# Patient Record
Sex: MALE | Employment: FULL TIME | URBAN - NONMETROPOLITAN AREA
[De-identification: names, ages, dates, MRNs, and addresses within clinical notes are randomized per-mention and may not be internally consistent; named-entity substitution may affect disease eponyms.]

---

## 2021-06-11 ENCOUNTER — APPOINTMENT (OUTPATIENT)
Dept: ULTRASOUND IMAGING | Age: 66
DRG: 683 | End: 2021-06-11
Attending: INTERNAL MEDICINE
Payer: COMMERCIAL

## 2021-06-11 ENCOUNTER — HOSPITAL ENCOUNTER (INPATIENT)
Age: 66
LOS: 3 days | Discharge: HOME | DRG: 683 | End: 2021-06-14
Attending: INTERNAL MEDICINE
Payer: COMMERCIAL

## 2021-06-11 DIAGNOSIS — N17.9 ACUTE KIDNEY INJURY (HCC): Primary | ICD-10-CM

## 2021-06-11 LAB
ANION GAP SERPL CALCULATED.3IONS-SCNC: 12 MEQ/L (ref 8–16)
BASOPHILS # BLD: 0.4 %
BASOPHILS ABSOLUTE: 0 THOU/MM3 (ref 0–0.1)
EOSINOPHIL # BLD: 0.8 %
EOSINOPHILS ABSOLUTE: 0.1 THOU/MM3 (ref 0–0.4)
ERYTHROCYTE [DISTWIDTH] IN BLOOD BY AUTOMATED COUNT: 13 % (ref 11.5–14.5)
ERYTHROCYTE [DISTWIDTH] IN BLOOD BY AUTOMATED COUNT: 42.8 FL (ref 35–45)
GFR SERPL CREATININE-BSD FRML MDRD: 12 ML/MIN/1.73M2
HCT VFR BLD CALC: 41.2 % (ref 42–52)
HEMOGLOBIN: 13.1 GM/DL (ref 14–18)
IMMATURE GRANS (ABS): 0.03 THOU/MM3 (ref 0–0.07)
IMMATURE GRANULOCYTES: 0.4 %
LYMPHOCYTES # BLD: 12.1 %
LYMPHOCYTES ABSOLUTE: 0.9 THOU/MM3 (ref 1–4.8)
MCH RBC QN AUTO: 28.6 PG (ref 26–33)
MCHC RBC AUTO-ENTMCNC: 31.8 GM/DL (ref 32.2–35.5)
MCV RBC AUTO: 90 FL (ref 80–94)
MONOCYTES # BLD: 6 %
MONOCYTES ABSOLUTE: 0.5 THOU/MM3 (ref 0.4–1.3)
NUCLEATED RED BLOOD CELLS: 0 /100 WBC
PLATELET # BLD: 173 THOU/MM3 (ref 130–400)
PMV BLD AUTO: 9.6 FL (ref 9.4–12.4)
RBC # BLD: 4.58 MILL/MM3 (ref 4.7–6.1)
SEG NEUTROPHILS: 80.3 %
SEGMENTED NEUTROPHILS ABSOLUTE COUNT: 6.1 THOU/MM3 (ref 1.8–7.7)
WBC # BLD: 7.6 THOU/MM3 (ref 4.8–10.8)

## 2021-06-11 PROCEDURE — 36415 COLL VENOUS BLD VENIPUNCTURE: CPT

## 2021-06-11 PROCEDURE — 99223 1ST HOSP IP/OBS HIGH 75: CPT | Performed by: INTERNAL MEDICINE

## 2021-06-11 PROCEDURE — 82550 ASSAY OF CK (CPK): CPT

## 2021-06-11 PROCEDURE — 6360000002 HC RX W HCPCS: Performed by: INTERNAL MEDICINE

## 2021-06-11 PROCEDURE — 76770 US EXAM ABDO BACK WALL COMP: CPT

## 2021-06-11 PROCEDURE — 83036 HEMOGLOBIN GLYCOSYLATED A1C: CPT

## 2021-06-11 PROCEDURE — 1200000003 HC TELEMETRY R&B

## 2021-06-11 PROCEDURE — 93005 ELECTROCARDIOGRAM TRACING: CPT | Performed by: INTERNAL MEDICINE

## 2021-06-11 PROCEDURE — 80053 COMPREHEN METABOLIC PANEL: CPT

## 2021-06-11 PROCEDURE — 6370000000 HC RX 637 (ALT 250 FOR IP): Performed by: INTERNAL MEDICINE

## 2021-06-11 PROCEDURE — 85025 COMPLETE CBC W/AUTO DIFF WBC: CPT

## 2021-06-11 PROCEDURE — 2580000003 HC RX 258: Performed by: INTERNAL MEDICINE

## 2021-06-11 RX ORDER — SIMVASTATIN 40 MG
40 TABLET ORAL NIGHTLY
COMMUNITY

## 2021-06-11 RX ORDER — SERTRALINE HYDROCHLORIDE 100 MG/1
150 TABLET, FILM COATED ORAL DAILY
COMMUNITY

## 2021-06-11 RX ORDER — CYCLOBENZAPRINE HCL 10 MG
10 TABLET ORAL 2 TIMES DAILY
COMMUNITY
End: 2022-11-01

## 2021-06-11 RX ORDER — KRILL/OM-3/DHA/EPA/PHOSPHO/AST 500MG-86MG
500 CAPSULE ORAL 2 TIMES DAILY
COMMUNITY

## 2021-06-11 RX ORDER — DEXTROSE MONOHYDRATE 25 G/50ML
12.5 INJECTION, SOLUTION INTRAVENOUS PRN
Status: DISCONTINUED | OUTPATIENT
Start: 2021-06-11 | End: 2021-06-12 | Stop reason: SDUPTHER

## 2021-06-11 RX ORDER — SODIUM CHLORIDE 9 MG/ML
25 INJECTION, SOLUTION INTRAVENOUS CONTINUOUS
Status: DISCONTINUED | OUTPATIENT
Start: 2021-06-11 | End: 2021-06-12

## 2021-06-11 RX ORDER — HEPARIN SODIUM 5000 [USP'U]/ML
5000 INJECTION, SOLUTION INTRAVENOUS; SUBCUTANEOUS EVERY 8 HOURS SCHEDULED
Status: DISCONTINUED | OUTPATIENT
Start: 2021-06-11 | End: 2021-06-14 | Stop reason: HOSPADM

## 2021-06-11 RX ORDER — LISINOPRIL 20 MG/1
20 TABLET ORAL DAILY
Status: ON HOLD | COMMUNITY
End: 2021-06-14 | Stop reason: HOSPADM

## 2021-06-11 RX ORDER — BUPROPION HYDROCHLORIDE 150 MG/1
150 TABLET, EXTENDED RELEASE ORAL DAILY
COMMUNITY

## 2021-06-11 RX ORDER — ACETAMINOPHEN 650 MG/1
650 SUPPOSITORY RECTAL EVERY 6 HOURS PRN
Status: DISCONTINUED | OUTPATIENT
Start: 2021-06-11 | End: 2021-06-14 | Stop reason: HOSPADM

## 2021-06-11 RX ORDER — GLIPIZIDE 10 MG/1
10 TABLET ORAL DAILY
COMMUNITY

## 2021-06-11 RX ORDER — ONDANSETRON 2 MG/ML
4 INJECTION INTRAMUSCULAR; INTRAVENOUS EVERY 6 HOURS PRN
Status: DISCONTINUED | OUTPATIENT
Start: 2021-06-11 | End: 2021-06-14 | Stop reason: HOSPADM

## 2021-06-11 RX ORDER — NICOTINE POLACRILEX 4 MG
15 LOZENGE BUCCAL PRN
Status: DISCONTINUED | OUTPATIENT
Start: 2021-06-11 | End: 2021-06-11 | Stop reason: SDUPTHER

## 2021-06-11 RX ORDER — DEXTROSE MONOHYDRATE 50 MG/ML
100 INJECTION, SOLUTION INTRAVENOUS PRN
Status: DISCONTINUED | OUTPATIENT
Start: 2021-06-11 | End: 2021-06-14 | Stop reason: HOSPADM

## 2021-06-11 RX ORDER — DEXTROSE MONOHYDRATE 25 G/50ML
12.5 INJECTION, SOLUTION INTRAVENOUS PRN
Status: DISCONTINUED | OUTPATIENT
Start: 2021-06-11 | End: 2021-06-11 | Stop reason: SDUPTHER

## 2021-06-11 RX ORDER — POLYETHYLENE GLYCOL 3350 17 G/17G
17 POWDER, FOR SOLUTION ORAL DAILY PRN
Status: DISCONTINUED | OUTPATIENT
Start: 2021-06-11 | End: 2021-06-14 | Stop reason: HOSPADM

## 2021-06-11 RX ORDER — DEXTROSE MONOHYDRATE 50 MG/ML
100 INJECTION, SOLUTION INTRAVENOUS PRN
Status: DISCONTINUED | OUTPATIENT
Start: 2021-06-11 | End: 2021-06-11 | Stop reason: SDUPTHER

## 2021-06-11 RX ORDER — ACETAMINOPHEN 325 MG/1
650 TABLET ORAL EVERY 6 HOURS PRN
Status: DISCONTINUED | OUTPATIENT
Start: 2021-06-11 | End: 2021-06-14 | Stop reason: HOSPADM

## 2021-06-11 RX ORDER — SODIUM CHLORIDE 0.9 % (FLUSH) 0.9 %
10 SYRINGE (ML) INJECTION PRN
Status: DISCONTINUED | OUTPATIENT
Start: 2021-06-11 | End: 2021-06-14 | Stop reason: HOSPADM

## 2021-06-11 RX ORDER — SODIUM CHLORIDE 0.9 % (FLUSH) 0.9 %
10 SYRINGE (ML) INJECTION EVERY 12 HOURS SCHEDULED
Status: DISCONTINUED | OUTPATIENT
Start: 2021-06-11 | End: 2021-06-14 | Stop reason: HOSPADM

## 2021-06-11 RX ORDER — M-VIT,TX,IRON,MINS/CALC/FOLIC 27MG-0.4MG
1 TABLET ORAL DAILY
COMMUNITY

## 2021-06-11 RX ORDER — MAGNESIUM SULFATE IN WATER 40 MG/ML
2000 INJECTION, SOLUTION INTRAVENOUS PRN
Status: DISCONTINUED | OUTPATIENT
Start: 2021-06-11 | End: 2021-06-11

## 2021-06-11 RX ORDER — SODIUM POLYSTYRENE SULFONATE 15 G/60ML
30 SUSPENSION ORAL; RECTAL ONCE
Status: COMPLETED | OUTPATIENT
Start: 2021-06-11 | End: 2021-06-11

## 2021-06-11 RX ORDER — SODIUM CHLORIDE 9 MG/ML
25 INJECTION, SOLUTION INTRAVENOUS CONTINUOUS
Status: DISCONTINUED | OUTPATIENT
Start: 2021-06-11 | End: 2021-06-11

## 2021-06-11 RX ORDER — NICOTINE POLACRILEX 4 MG
15 LOZENGE BUCCAL PRN
Status: DISCONTINUED | OUTPATIENT
Start: 2021-06-11 | End: 2021-06-12 | Stop reason: SDUPTHER

## 2021-06-11 RX ORDER — ONDANSETRON 4 MG/1
4 TABLET, ORALLY DISINTEGRATING ORAL EVERY 8 HOURS PRN
Status: DISCONTINUED | OUTPATIENT
Start: 2021-06-11 | End: 2021-06-14 | Stop reason: HOSPADM

## 2021-06-11 RX ADMIN — CALCIUM GLUCONATE 2000 MG: 98 INJECTION, SOLUTION INTRAVENOUS at 23:52

## 2021-06-11 RX ADMIN — SODIUM POLYSTYRENE SULFONATE 30 G: 15 SUSPENSION ORAL; RECTAL at 23:52

## 2021-06-11 RX ADMIN — INSULIN HUMAN 10 UNITS: 100 INJECTION, SOLUTION PARENTERAL at 23:52

## 2021-06-11 RX ADMIN — SODIUM CHLORIDE 25 ML: 9 INJECTION, SOLUTION INTRAVENOUS at 21:45

## 2021-06-11 ASSESSMENT — PAIN SCALES - GENERAL: PAINLEVEL_OUTOF10: 0

## 2021-06-11 NOTE — PROGRESS NOTES
Transfer  Report from  Vince King   Referring Physician  Dr. Edwige Dalal  Accepting Physician  Dr. Dereck Brown  Patient Condition:     59. Patient at [facility] ER of Dr. Edwige Dalal. Patient has a history of HTN, DM. Patient had been feeling bad had outpatient labs drawn and was told to go to ER due to lab results. K= 6.3, CR 5.4, , GFR 11, CK 72- no renal hx . Still making urine. UA and CBC \" good\" . Vitals B/P 120/56, HR 72, T 98.2, 97% RA.  Admit Inpatient, 6k-tele, Acute kidney injury

## 2021-06-12 LAB
ALBUMIN SERPL-MCNC: 4.1 G/DL (ref 3.5–5.1)
ALBUMIN SERPL-MCNC: 4.7 G/DL (ref 3.5–5.1)
ALP BLD-CCNC: 50 U/L (ref 38–126)
ALP BLD-CCNC: 55 U/L (ref 38–126)
ALT SERPL-CCNC: 28 U/L (ref 11–66)
ALT SERPL-CCNC: 31 U/L (ref 11–66)
ANION GAP SERPL CALCULATED.3IONS-SCNC: 14 MEQ/L (ref 8–16)
AST SERPL-CCNC: 16 U/L (ref 5–40)
AST SERPL-CCNC: 16 U/L (ref 5–40)
AVERAGE GLUCOSE: 141 MG/DL (ref 70–126)
AVERAGE GLUCOSE: 141 MG/DL (ref 70–126)
BACTERIA: NORMAL
BILIRUB SERPL-MCNC: 0.2 MG/DL (ref 0.3–1.2)
BILIRUB SERPL-MCNC: 0.3 MG/DL (ref 0.3–1.2)
BILIRUBIN URINE: NEGATIVE
BLOOD, URINE: NEGATIVE
BUN BLDV-MCNC: 98 MG/DL (ref 7–22)
BUN BLDV-MCNC: 99 MG/DL (ref 7–22)
CALCIUM SERPL-MCNC: 9.4 MG/DL (ref 8.5–10.5)
CALCIUM SERPL-MCNC: 9.9 MG/DL (ref 8.5–10.5)
CASTS: NORMAL /LPF
CASTS: NORMAL /LPF
CHARACTER, URINE: CLEAR
CHLORIDE BLD-SCNC: 103 MEQ/L (ref 98–111)
CHLORIDE BLD-SCNC: 104 MEQ/L (ref 98–111)
CO2: 19 MEQ/L (ref 23–33)
CO2: 20 MEQ/L (ref 23–33)
COLOR: YELLOW
CREAT SERPL-MCNC: 4.7 MG/DL (ref 0.4–1.2)
CREAT SERPL-MCNC: 4.8 MG/DL (ref 0.4–1.2)
CRYSTALS: NORMAL
EKG ATRIAL RATE: 72 BPM
EKG ATRIAL RATE: 75 BPM
EKG P AXIS: 32 DEGREES
EKG P AXIS: 38 DEGREES
EKG P-R INTERVAL: 176 MS
EKG P-R INTERVAL: 190 MS
EKG Q-T INTERVAL: 328 MS
EKG Q-T INTERVAL: 346 MS
EKG QRS DURATION: 92 MS
EKG QRS DURATION: 92 MS
EKG QTC CALCULATION (BAZETT): 366 MS
EKG QTC CALCULATION (BAZETT): 378 MS
EKG R AXIS: -48 DEGREES
EKG R AXIS: -53 DEGREES
EKG T AXIS: 47 DEGREES
EKG T AXIS: 51 DEGREES
EKG VENTRICULAR RATE: 72 BPM
EKG VENTRICULAR RATE: 75 BPM
EPITHELIAL CELLS, UA: NORMAL /HPF
ERYTHROCYTE [DISTWIDTH] IN BLOOD BY AUTOMATED COUNT: 13.1 % (ref 11.5–14.5)
ERYTHROCYTE [DISTWIDTH] IN BLOOD BY AUTOMATED COUNT: 42.8 FL (ref 35–45)
GFR SERPL CREATININE-BSD FRML MDRD: 13 ML/MIN/1.73M2
GLUCOSE BLD-MCNC: 116 MG/DL (ref 70–108)
GLUCOSE BLD-MCNC: 135 MG/DL (ref 70–108)
GLUCOSE BLD-MCNC: 175 MG/DL (ref 70–108)
GLUCOSE BLD-MCNC: 201 MG/DL (ref 70–108)
GLUCOSE BLD-MCNC: 88 MG/DL (ref 70–108)
GLUCOSE, URINE: NEGATIVE MG/DL
HBA1C MFR BLD: 6.7 % (ref 4.4–6.4)
HBA1C MFR BLD: 6.7 % (ref 4.4–6.4)
HCT VFR BLD CALC: 38.1 % (ref 42–52)
HEMOGLOBIN: 12.3 GM/DL (ref 14–18)
KETONES, URINE: NEGATIVE
LACTIC ACID: 1.2 MMOL/L (ref 0.5–2)
LEUKOCYTE ESTERASE, URINE: NEGATIVE
MCH RBC QN AUTO: 29.1 PG (ref 26–33)
MCHC RBC AUTO-ENTMCNC: 32.3 GM/DL (ref 32.2–35.5)
MCV RBC AUTO: 90.3 FL (ref 80–94)
MISCELLANEOUS LAB TEST RESULT: NORMAL
NITRITE, URINE: NEGATIVE
PH UA: 6.5 (ref 5–9)
PLATELET # BLD: 146 THOU/MM3 (ref 130–400)
PMV BLD AUTO: 10.1 FL (ref 9.4–12.4)
POTASSIUM REFLEX MAGNESIUM: 6.9 MEQ/L (ref 3.5–5.2)
POTASSIUM REFLEX MAGNESIUM: 7.1 MEQ/L (ref 3.5–5.2)
POTASSIUM SERPL-SCNC: 4.7 MEQ/L (ref 3.5–5.2)
POTASSIUM SERPL-SCNC: 6.2 MEQ/L (ref 3.5–5.2)
PROTEIN UA: NEGATIVE MG/DL
RBC # BLD: 4.22 MILL/MM3 (ref 4.7–6.1)
RBC URINE: NORMAL /HPF
RENAL EPITHELIAL, UA: NORMAL
SODIUM BLD-SCNC: 136 MEQ/L (ref 135–145)
SODIUM BLD-SCNC: 136 MEQ/L (ref 135–145)
SPECIFIC GRAVITY UA: 1.01 (ref 1–1.03)
TOTAL CK: 62 U/L (ref 55–170)
TOTAL PROTEIN: 6.9 G/DL (ref 6.1–8)
TOTAL PROTEIN: 7.1 G/DL (ref 6.1–8)
UROBILINOGEN, URINE: 0.2 EU/DL (ref 0–1)
WBC # BLD: 5.3 THOU/MM3 (ref 4.8–10.8)
WBC UA: NORMAL /HPF
YEAST: NORMAL

## 2021-06-12 PROCEDURE — 1200000003 HC TELEMETRY R&B

## 2021-06-12 PROCEDURE — 85027 COMPLETE CBC AUTOMATED: CPT

## 2021-06-12 PROCEDURE — 6360000002 HC RX W HCPCS: Performed by: INTERNAL MEDICINE

## 2021-06-12 PROCEDURE — 2580000003 HC RX 258: Performed by: INTERNAL MEDICINE

## 2021-06-12 PROCEDURE — 93010 ELECTROCARDIOGRAM REPORT: CPT | Performed by: NUCLEAR MEDICINE

## 2021-06-12 PROCEDURE — 2500000003 HC RX 250 WO HCPCS: Performed by: INTERNAL MEDICINE

## 2021-06-12 PROCEDURE — 80053 COMPREHEN METABOLIC PANEL: CPT

## 2021-06-12 PROCEDURE — 6370000000 HC RX 637 (ALT 250 FOR IP): Performed by: INTERNAL MEDICINE

## 2021-06-12 PROCEDURE — 36415 COLL VENOUS BLD VENIPUNCTURE: CPT

## 2021-06-12 PROCEDURE — 81001 URINALYSIS AUTO W/SCOPE: CPT

## 2021-06-12 PROCEDURE — 51798 US URINE CAPACITY MEASURE: CPT

## 2021-06-12 PROCEDURE — 84132 ASSAY OF SERUM POTASSIUM: CPT

## 2021-06-12 PROCEDURE — 99233 SBSQ HOSP IP/OBS HIGH 50: CPT | Performed by: INTERNAL MEDICINE

## 2021-06-12 PROCEDURE — 82948 REAGENT STRIP/BLOOD GLUCOSE: CPT

## 2021-06-12 PROCEDURE — 6370000000 HC RX 637 (ALT 250 FOR IP): Performed by: STUDENT IN AN ORGANIZED HEALTH CARE EDUCATION/TRAINING PROGRAM

## 2021-06-12 PROCEDURE — 93005 ELECTROCARDIOGRAM TRACING: CPT | Performed by: INTERNAL MEDICINE

## 2021-06-12 PROCEDURE — 99254 IP/OBS CNSLTJ NEW/EST MOD 60: CPT | Performed by: INTERNAL MEDICINE

## 2021-06-12 PROCEDURE — 83605 ASSAY OF LACTIC ACID: CPT

## 2021-06-12 PROCEDURE — 2500000003 HC RX 250 WO HCPCS: Performed by: FAMILY MEDICINE

## 2021-06-12 RX ORDER — SODIUM POLYSTYRENE SULFONATE 15 G/60ML
30 SUSPENSION ORAL; RECTAL ONCE
Status: COMPLETED | OUTPATIENT
Start: 2021-06-12 | End: 2021-06-12

## 2021-06-12 RX ORDER — CYCLOBENZAPRINE HCL 10 MG
5 TABLET ORAL 2 TIMES DAILY
Status: DISCONTINUED | OUTPATIENT
Start: 2021-06-12 | End: 2021-06-14 | Stop reason: HOSPADM

## 2021-06-12 RX ORDER — CYCLOBENZAPRINE HCL 10 MG
10 TABLET ORAL 2 TIMES DAILY
Status: DISCONTINUED | OUTPATIENT
Start: 2021-06-12 | End: 2021-06-12

## 2021-06-12 RX ORDER — ATORVASTATIN CALCIUM 20 MG/1
20 TABLET, FILM COATED ORAL DAILY
Status: DISCONTINUED | OUTPATIENT
Start: 2021-06-12 | End: 2021-06-14 | Stop reason: HOSPADM

## 2021-06-12 RX ORDER — BUPROPION HYDROCHLORIDE 150 MG/1
150 TABLET, EXTENDED RELEASE ORAL DAILY
Status: DISCONTINUED | OUTPATIENT
Start: 2021-06-12 | End: 2021-06-14 | Stop reason: HOSPADM

## 2021-06-12 RX ORDER — DEXTROSE MONOHYDRATE 50 MG/ML
100 INJECTION, SOLUTION INTRAVENOUS PRN
Status: DISCONTINUED | OUTPATIENT
Start: 2021-06-12 | End: 2021-06-12 | Stop reason: SDUPTHER

## 2021-06-12 RX ORDER — DEXTROSE MONOHYDRATE 25 G/50ML
12.5 INJECTION, SOLUTION INTRAVENOUS PRN
Status: DISCONTINUED | OUTPATIENT
Start: 2021-06-12 | End: 2021-06-14 | Stop reason: HOSPADM

## 2021-06-12 RX ORDER — DEXTROSE MONOHYDRATE 25 G/50ML
25 INJECTION, SOLUTION INTRAVENOUS PRN
Status: DISCONTINUED | OUTPATIENT
Start: 2021-06-12 | End: 2021-06-14 | Stop reason: HOSPADM

## 2021-06-12 RX ORDER — NICOTINE POLACRILEX 4 MG
15 LOZENGE BUCCAL PRN
Status: DISCONTINUED | OUTPATIENT
Start: 2021-06-12 | End: 2021-06-14 | Stop reason: HOSPADM

## 2021-06-12 RX ADMIN — DEXTROSE MONOHYDRATE 25 G: 25 INJECTION, SOLUTION INTRAVENOUS at 00:07

## 2021-06-12 RX ADMIN — SODIUM BICARBONATE: 84 INJECTION, SOLUTION INTRAVENOUS at 10:15

## 2021-06-12 RX ADMIN — CYCLOBENZAPRINE 5 MG: 10 TABLET, FILM COATED ORAL at 21:00

## 2021-06-12 RX ADMIN — CYCLOBENZAPRINE 10 MG: 10 TABLET, FILM COATED ORAL at 13:22

## 2021-06-12 RX ADMIN — SODIUM BICARBONATE 50 MEQ: 84 INJECTION INTRAVENOUS at 10:15

## 2021-06-12 RX ADMIN — BUPROPION HYDROCHLORIDE 150 MG: 150 TABLET, EXTENDED RELEASE ORAL at 13:19

## 2021-06-12 RX ADMIN — CALCIUM GLUCONATE 2000 MG: 98 INJECTION, SOLUTION INTRAVENOUS at 06:32

## 2021-06-12 RX ADMIN — ATORVASTATIN CALCIUM 20 MG: 20 TABLET, FILM COATED ORAL at 13:19

## 2021-06-12 RX ADMIN — HEPARIN SODIUM 5000 UNITS: 5000 INJECTION INTRAVENOUS; SUBCUTANEOUS at 14:01

## 2021-06-12 RX ADMIN — HEPARIN SODIUM 5000 UNITS: 5000 INJECTION INTRAVENOUS; SUBCUTANEOUS at 05:56

## 2021-06-12 RX ADMIN — HEPARIN SODIUM 5000 UNITS: 5000 INJECTION INTRAVENOUS; SUBCUTANEOUS at 20:59

## 2021-06-12 RX ADMIN — SERTRALINE HYDROCHLORIDE 150 MG: 100 TABLET, FILM COATED ORAL at 13:19

## 2021-06-12 RX ADMIN — SODIUM POLYSTYRENE SULFONATE 30 G: 15 SUSPENSION ORAL; RECTAL at 06:53

## 2021-06-12 ASSESSMENT — PAIN SCALES - GENERAL
PAINLEVEL_OUTOF10: 0

## 2021-06-12 NOTE — H&P
History & Physical        Patient:  Vanessa Ellis  YOB: 1955    MRN: [id number]     Acct: [de-identified]    PCP: CHANTELLE Sales CNP    Date of Admission: 6/11/2021    Date of Service: Pt seen/examined on 06/11/21  and Admitted to Inpatient with expected LOS greater than two midnights due to medical therapy. Chief Complaint:      Weakness, fatigue for three days referred for review due to Acute renal failure      History Of Present Illness:     63 y.o. male who presented to [address] [place] [address] with the above complain. Patient reports working at [employer] where he is exposed to high temp (90F). Four days ago, he felt hot near the end of the shift and almost had a fall while trying to walk out of premise to home. He also felt very weak, had chest soreness on reached home. Later that night, he reports muscle pain which progressively worsened the day after. He tried to hydrate without relief and spent the following day in bed. Yesterday, he went to [facility], had blood draw and was referred to Outside facility for CXR and ECG. Today, patient was called back and told to go to ER due to lab results showing severe acute renal failure. He reports continued good UOP that is more clear and has no unusual smell. He also denies dysuria, urgency, hesitancy, inability to start, fever or chill. He reports hx of renal stone extracted at [facility] sometimes back. His last wellness check was a month ago and his renal function was normal. He denies medication or lifestyle changes recently. Muscle and chest soreness has almost resolved. Lab results done yesterday shows K of 6.6 and creatinine of 5.4.      Past Medical History:      Renal stone  Major depressive do  Diabetes mellitus II  Obesity  HTN    Past Surgical History:      R- shoulder surgery  Hernia repair    Medications Prior to Admission:      Prior to Admission medications    Not on File  Vitamin D  Glucosamine  Bupropion  Lisinopril  Flexeril  Metformine  Setaraline  Glipizide         Allergies:  Penicillins  No Known Drug allergy  Social History:      The patient currently lives with family    TOBACCO:   no    ETOH:   has no history on file for alcohol use. Family History:      Reviewed in detail and negative for DM, CAD, Cancer, CVA. Positive as follows:    Family history on file. Diabetes  Kidney stone    Diet:  Renal/diabetes    REVIEW OF SYSTEMS:   Pertinent positives as noted in the HPI. All other systems reviewed and negative. PHYSICAL EXAM:    Ht 5' 7\" (1.702 m)   Wt 235 lb (106.6 kg)   BMI 36.81 kg/m²     General appearance:  No apparent distress, appears stated age and cooperative. HEENT:  Normal cephalic, atraumatic without obvious deformity. Pupils equal, round, and reactive to light. Extra ocular muscles intact. Conjunctivae/corneas clear. Neck: Supple, with full range of motion. No jugular venous distention. Trachea midline. Respiratory:  Normal respiratory effort. Clear to auscultation, bilaterally without Rales/Wheezes/Rhonchi. Cardiovascular:  Regular rate and rhythm with normal S1/S2 with 2/6 murmurs at the base without radiation. No rubs or gallops. Abdomen: Soft, non-tender, non-distended with normal bowel sounds. Musculoskeletal:  No clubbing, cyanosis or edema bilaterally. Full range of motion without deformity. Skin: Skin color, texture, turgor normal.  No rashes or lesions. Neurologic:  Neurovascularly intact without any focal sensory/motor deficits.  Cranial nerves: II-XII intact, grossly non-focal.  Psychiatric:  Alert and oriented, thought content appropriate, normal insight  Capillary Refill: Brisk,< 3 seconds   Peripheral Pulses: +2 palpable, equal bilaterally     Labs:     Recent Labs     06/11/21  2143   WBC 7.6   HGB 13.1*   HCT 41.2*        Recent Labs     06/11/21  2143         CO2 20*   BUN 98*   CREATININE 4.8*   CALCIUM 9.9 Recent Labs     06/11/21  2143   AST 16   ALT 31   BILITOT 0.3   ALKPHOS 55     No results for input(s): INR in the last 72 hours. No results for input(s): [place] [place] in the last 72 hours. Urinalysis:    No results found for: NITRU, WBCUA, BACTERIA, RBCUA, BLOODU, SPECGRAV, GLUCOSEU    Intake & Output:  No intake/output data recorded. No intake/output data recorded. Radiology:     CXR: None  EKG:  I have reviewed the EKG with the following interpretation:     US RENAL COMPLETE    (Results Pending)        DVT prophylaxis: Heparin    Code Status: Full Code      PT/OT Eval Status:     Daniel De La O    Diagnosis Date Noted    Acute kidney injury ([facility] [address] [N17.9] 06/11/2021       PLAN:    · Acute renal failure- unclear source. Potentially rhabdomyolysis induced versus Nephrolithiasis versus secondary to diabetes. Most likely secondary to dehydration compounded by rhabdo. Denies urine color change, anuria or hematuria. UA negative for glucosuria or proteinuria. Will check CPK level, Hepatitis panel and Complement level. Will start hydration and check Renal US. Will consult nephrology to review and advise if further villa is necessary  · Hyperkalemia- secondary to poor renal clearance. Reversed with insulin and IVF at outside facility but has rebounded on arrival. Will give kayexalate and Reverse again per protocol. Continue IVF and hold lisinopril. consider dialysis if continues to remain high  · Diabetes mellitus II- appears to be well controlled with Glipizide and metformin. Start Insulin ISS  · HTN- BP elevated. Hold lisinopril and start Norvasc. · Major Depressive Do- well controlled. Continue home antidepressant regimen  · Hx of kidney stone- Renal US as above. If obstructive, will consult urology. Thank you CHANTELLE Leija - NISEHA for the opportunity to be involved in this patient's care.     Electronically signed by Olive Escobar MD on 6/11/2021 at 10:46 PM

## 2021-06-12 NOTE — PROCEDURES
A Bladder scan was performed at 1335 . The residual amount was measured to be 120 ML. Report of results was given to [facility].

## 2021-06-12 NOTE — PLAN OF CARE
Problem: Falls - Risk of:  Goal: Will remain free from falls  Description: Will remain free from falls  Outcome: Ongoing  Goal: Absence of physical injury  Description: Absence of physical injury  Outcome: Ongoing     Problem: Physical Regulation:  Goal: Will remain free from infection  Description: Will remain free from infection  Outcome: Ongoing  Goal: Complications related to the disease process, condition or treatment will be avoided or minimized  Description: Complications related to the disease process, condition or treatment will be avoided or minimized  Outcome: Ongoing

## 2021-06-12 NOTE — PROGRESS NOTES
Hospitalist Progress Note    Patient:  Samantha Justin      Unit/Bed:6K-03/003-A    YOB: 1955    MRN: [id number]       Acct: [de-identified]     PCP: CHANTELLE Leija CNP    Date of Admission: 6/11/2021    Assessment/Plan:    1. MARK: Unknown baseline. From outside source at [facility] creatinine on 6/11/21 was 5.4, improved to 4.7 at this facility. BUN/Cr >20, appears pre-renal azotemia. He appears dry on exam and reports subjective decline in his urine output. Renal U/S negative for hydronephrosis. Urine studies pending. Nephrology following. Continue D5W with 150 mEq sodium bicarbonate @ 150 cc/hr per Nephrology. 2. Hyperkalemia: Initially 6.8. s/p Calcium Gluconate 2g x2 and Kayexalate 30g x2. Most recent K is 6.2. Monitor BMP. 3. HTN: Holding home Lisinopril. BP currently controlled. Monitor for now. 4. NIDDM2: HgbA1C 6.7%. Continue SSI. Hypoglycemic protocols in place. 5. MDD: Continue Wellbutrin SR 150mg and Zoloft 150mg. 6. Hx Kidney Stones: Renal U/S reports no hydronephrosis or stones. Expected discharge date:  TBD    Disposition:    [x] Home       [] TCU       [] Rehab       [] Psych       [] SNF       [] [place]       [] Other-    Chief Complaint: Weakness, fatigue, abnormal labs    Hospital Course:   Patient is a 64 y/o  M with PMH NIDDM2, HTN, MDD, and Hx renal stones. He presented to [facility] ED with complaints of weakness, fatigue for 3 days and referred due to abnormal lab value. 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 He felt hot near the end of his shift and almost had a fall while trying to walk out out of his job to get home. He felt very weakn and also had middle chest soreness. Later that night, he reports muscle pain which progressively worsened the day after. He tried to hydrate without relief and spend the following day in bed.  He went to [x] SQ Heparin                                 [] Encourage ambulation           [] Already on Anticoagulation     Code Status: Full Code    Tele:   [x] yes             [] no    Electronically signed by Hunter Shah DO on 6/12/2021 at 11:33 AM

## 2021-06-12 NOTE — CONSULTS
[facility]    [address], [address]  [facility]  6/12/2021 11:55 AM    Pt Name:    Carey Tellez  MRN:     [id number]   [id number]  YOB: 1955  Admit Date:    6/11/2021  8:14 PM  Primary Care Physician:  CHANTELLE Burkett - CNP    CSN Number:   [id number]    Reason for Consult:  MARK, critical hyperkalemia  Requesting provider:  Dr. Alvarez Maynard    History:   The patient is a 63 y.o. pleasant white male who nephrology was consulted this morning for management of MARK and hyperkalemia. Patient has history of diabetes mellitus, hypertension, history of kidney stone who has not been feeling very well over last several days. Patient reports feeling very weak and tired. He almost had a near syncope episode at his workplace. He went to a local clinic and had blood work. He was subsequently called by his provider to go to the emergency room due to severe hyperkalemia and acute renal failure. Patient reports poor oral intake. He reports generalized muscle pain. He reports feeling dehydrated. Reports extreme exhaustion. Symptoms worse with any minimal activity. Denies any chest pain or any shortness of breath. Denies any falls. Denies any pneumaturia. Denies any cough. Patient does endorse taking some ibuprofen at home. Nephrology has been consulted for management of acute kidney injury and hyperkalemia. He was medically managed by the hospitalist overnight and yet his potassium continues to rise. No acute distress at this time. Past Medical History:  Diabetes, hypertension, kidney stone    Past Surgical History:  No past surgical history on file.     Family History:  Patient denies any history of any kidney problems in the family  Positive for hypertension    Social History:  Social History     Socioeconomic History    Marital status: Unknown     Spouse name: Not on file    Number of children: Not on file    Years of education: ointment Apply topically 2 times daily Apply topically 3 times daily prn   Yes Historical Provider, MD   lisinopril (PRINIVIL;ZESTRIL) 20 MG tablet Take 20 mg by mouth daily   Yes Historical Provider, MD   buPROPion (WELLBUTRIN SR) 150 MG extended release tablet Take 150 mg by mouth daily   Yes Historical Provider, MD   metFORMIN (GLUCOPHAGE) 1000 MG tablet Take 1,000 mg by mouth 2 times daily (with meals)   Yes Historical Provider, MD   sertraline (ZOLOFT) 100 MG tablet Take 150 mg by mouth daily   Yes Historical Provider, MD   simvastatin (ZOCOR) 40 MG tablet Take 40 mg by mouth nightly   Yes Historical Provider, MD   cyclobenzaprine (FLEXERIL) 10 MG tablet Take 10 mg by mouth 2 times daily   Yes Historical Provider, MD   glipiZIDE (GLUCOTROL) 10 MG tablet Take 10 mg by mouth daily   Yes Historical Provider, MD       Review of Systems:  Constitutional: Positive for generalized weakness, fatigue, decreased oral intake, muscle pain  Head: Negative for headaches  Eyes: Negative for blurry vision or discharge  Ears: Negative for ear pain or hearing changes  Nose: Negative for runny nose or epistaxis  Respiratory: Negative for shortness of breath. Negative for cough or sputum production. Negative for hemoptysis  Cardiovascular: Negative for chest pain  GI: Negative for nausea, vomiting and diarrhea.   Negative for hematochezia and melena  : Negative for discharge, dysuria, or hematuria  Skin: Negative for rash  Musculoskeletal: Positive for muscle pain, generalized body aches  Neuro: Negative for numbness or tingling, negative for slurred speech  Psychiatric: Reports stable mood    All other review of systems were reviewed and negative    Current Meds:  Infusion:    sodium bicarbonate infusion 150 mL/hr at 06/12/21 1015    dextrose       Meds:    insulin lispro  0-12 Units Subcutaneous TID WC    insulin lispro  0-6 Units Subcutaneous Nightly    atorvastatin  20 mg Oral Daily    sertraline  150 mg Oral Daily    cyclobenzaprine  10 mg Oral BID    buPROPion  150 mg Oral Daily    sodium chloride flush  10 mL Intravenous 2 times per day    heparin (porcine)  5,000 Units Subcutaneous 3 times per day     Meds prn: dextrose, glucose, dextrose, glucagon (rDNA), sodium chloride flush, ondansetron **OR** ondansetron, polyethylene glycol, acetaminophen **OR** acetaminophen, dextrose     Allergies/Intolerances: ALLERGIES: Penicillins    24HR INTAKE/OUTPUT:  No intake or output data in the 24 hours ending 06/12/21 1155  No intake/output data recorded. No intake/output data recorded. Admission weight: 235 lb (106.6 kg)  Wt Readings from Last 3 Encounters:   06/11/21 235 lb (106.6 kg)     Body mass index is 36.81 kg/m². Physical Examination:  VITALS:   Vitals:    06/12/21 0000 06/12/21 0420 06/12/21 0800 06/12/21 1145   BP: 129/60 134/70 128/63 138/75   Pulse: 72 70 85 76   Resp: 18 18 20 18   Temp: 97.9 °F (36.6 °C) 98 °F (36.7 °C) 98.2 °F (36.8 °C) 98.1 °F (36.7 °C)   TempSrc: Oral Oral     SpO2: 95% 96% 99% 97%   Weight:       Height:         Weight:   Wt Readings from Last 3 Encounters:   06/11/21 235 lb (106.6 kg)     Constitutional and General Appearance: alert and cooperative with exam, appears comfortable, no distress, not diaphoretic. Somewhat slow to respond. Nontoxic-appearing  Eyes: no icteric sclera in left eye or right eye,  no pallor conjunctiva in left or right eye, no discharge seen from left eye or right eye  Ears and Nose: normal external appearance of left and right ear. No active drainage from nose.    Oral: Mildly dry appearing oral mucus membranes  Neck: No jugular venous distention, appears symmetric, good ROM  Lungs: Air entry B/L, no crackles or rales, no use of accessory muscles or labored breathing  Heart: regular rate, S1, S2  Extremities: No LE edema, no tenderness  GI: soft, non-tender, no guarding, no distention  Skin: no rash seen on exposed extremities, warm to touch  Musculo: moves all Addendum  Kidney/bladder ultrasound shows greater than 400 mL of urine in bladder. Will repeat bladder scan.   Discussed with NAJMA Prado MD  [facility]

## 2021-06-13 LAB
ALBUMIN SERPL-MCNC: 4 G/DL (ref 3.5–5.1)
ALP BLD-CCNC: 49 U/L (ref 38–126)
ALT SERPL-CCNC: 28 U/L (ref 11–66)
ANION GAP SERPL CALCULATED.3IONS-SCNC: 10 MEQ/L (ref 8–16)
AST SERPL-CCNC: 17 U/L (ref 5–40)
BILIRUB SERPL-MCNC: 0.2 MG/DL (ref 0.3–1.2)
BUN BLDV-MCNC: 55 MG/DL (ref 7–22)
CALCIUM SERPL-MCNC: 9.5 MG/DL (ref 8.5–10.5)
CHLORIDE BLD-SCNC: 103 MEQ/L (ref 98–111)
CO2: 28 MEQ/L (ref 23–33)
CREAT SERPL-MCNC: 2.1 MG/DL (ref 0.4–1.2)
ERYTHROCYTE [DISTWIDTH] IN BLOOD BY AUTOMATED COUNT: 12.9 % (ref 11.5–14.5)
ERYTHROCYTE [DISTWIDTH] IN BLOOD BY AUTOMATED COUNT: 41.8 FL (ref 35–45)
GFR SERPL CREATININE-BSD FRML MDRD: 32 ML/MIN/1.73M2
GLUCOSE BLD-MCNC: 124 MG/DL (ref 70–108)
GLUCOSE BLD-MCNC: 156 MG/DL (ref 70–108)
GLUCOSE BLD-MCNC: 181 MG/DL (ref 70–108)
GLUCOSE BLD-MCNC: 203 MG/DL (ref 70–108)
GLUCOSE BLD-MCNC: 212 MG/DL (ref 70–108)
HCT VFR BLD CALC: 36.6 % (ref 42–52)
HEMOGLOBIN: 12.1 GM/DL (ref 14–18)
MCH RBC QN AUTO: 29.2 PG (ref 26–33)
MCHC RBC AUTO-ENTMCNC: 33.1 GM/DL (ref 32.2–35.5)
MCV RBC AUTO: 88.2 FL (ref 80–94)
PLATELET # BLD: 141 THOU/MM3 (ref 130–400)
PMV BLD AUTO: 10.1 FL (ref 9.4–12.4)
POTASSIUM REFLEX MAGNESIUM: 4.6 MEQ/L (ref 3.5–5.2)
RBC # BLD: 4.15 MILL/MM3 (ref 4.7–6.1)
SODIUM BLD-SCNC: 141 MEQ/L (ref 135–145)
TOTAL PROTEIN: 6.9 G/DL (ref 6.1–8)
WBC # BLD: 4 THOU/MM3 (ref 4.8–10.8)

## 2021-06-13 PROCEDURE — 6360000002 HC RX W HCPCS: Performed by: INTERNAL MEDICINE

## 2021-06-13 PROCEDURE — 6370000000 HC RX 637 (ALT 250 FOR IP): Performed by: STUDENT IN AN ORGANIZED HEALTH CARE EDUCATION/TRAINING PROGRAM

## 2021-06-13 PROCEDURE — 99233 SBSQ HOSP IP/OBS HIGH 50: CPT | Performed by: INTERNAL MEDICINE

## 2021-06-13 PROCEDURE — 82948 REAGENT STRIP/BLOOD GLUCOSE: CPT

## 2021-06-13 PROCEDURE — 80053 COMPREHEN METABOLIC PANEL: CPT

## 2021-06-13 PROCEDURE — 99232 SBSQ HOSP IP/OBS MODERATE 35: CPT | Performed by: INTERNAL MEDICINE

## 2021-06-13 PROCEDURE — 6370000000 HC RX 637 (ALT 250 FOR IP): Performed by: INTERNAL MEDICINE

## 2021-06-13 PROCEDURE — 2580000003 HC RX 258: Performed by: INTERNAL MEDICINE

## 2021-06-13 PROCEDURE — 85027 COMPLETE CBC AUTOMATED: CPT

## 2021-06-13 PROCEDURE — 36415 COLL VENOUS BLD VENIPUNCTURE: CPT

## 2021-06-13 PROCEDURE — 1200000003 HC TELEMETRY R&B

## 2021-06-13 RX ORDER — SODIUM CHLORIDE 9 MG/ML
INJECTION, SOLUTION INTRAVENOUS CONTINUOUS
Status: DISCONTINUED | OUTPATIENT
Start: 2021-06-13 | End: 2021-06-14 | Stop reason: HOSPADM

## 2021-06-13 RX ORDER — ALOGLIPTIN 12.5 MG/1
12.5 TABLET, FILM COATED ORAL DAILY
Status: DISCONTINUED | OUTPATIENT
Start: 2021-06-13 | End: 2021-06-14 | Stop reason: HOSPADM

## 2021-06-13 RX ADMIN — HEPARIN SODIUM 5000 UNITS: 5000 INJECTION INTRAVENOUS; SUBCUTANEOUS at 14:23

## 2021-06-13 RX ADMIN — SODIUM CHLORIDE: 9 INJECTION, SOLUTION INTRAVENOUS at 15:30

## 2021-06-13 RX ADMIN — CYCLOBENZAPRINE 5 MG: 10 TABLET, FILM COATED ORAL at 08:23

## 2021-06-13 RX ADMIN — ATORVASTATIN CALCIUM 20 MG: 20 TABLET, FILM COATED ORAL at 08:23

## 2021-06-13 RX ADMIN — HEPARIN SODIUM 5000 UNITS: 5000 INJECTION INTRAVENOUS; SUBCUTANEOUS at 21:08

## 2021-06-13 RX ADMIN — CYCLOBENZAPRINE 5 MG: 10 TABLET, FILM COATED ORAL at 21:09

## 2021-06-13 RX ADMIN — ALOGLIPTIN 12.5 MG: 12.5 TABLET, FILM COATED ORAL at 16:39

## 2021-06-13 RX ADMIN — HEPARIN SODIUM 5000 UNITS: 5000 INJECTION INTRAVENOUS; SUBCUTANEOUS at 06:08

## 2021-06-13 ASSESSMENT — PAIN SCALES - GENERAL
PAINLEVEL_OUTOF10: 0

## 2021-06-13 ASSESSMENT — 6 MINUTE WALK TEST (6MWT): DID PATIENT STOP OR PAUSE BEFORE 6 MINUTES?: YES

## 2021-06-13 NOTE — PROGRESS NOTES
Hospitalist Progress Note    Patient:  Salinas Vazquez      Unit/Bed:6K-03/003-A    YOB: 1955    MRN: [id number]       Acct: [de-identified]     PCP: CHANTELLE Montalvo CNP    Date of Admission: 6/11/2021      Assessment/Plan:  Active Hospital Problems    Diagnosis Date Noted    Hyperkalemia [E87.5]     Acute kidney injury ([address] [N17.9] 06/11/2021       1. MARK: Unknown baseline, patient reports taking frequent Ibuprogen. From outside source at [facility] creatinine on 6/11/21 was 5.4, improved to 4.7 at this facility. BUN/Cr >20, appears pre-renal azotemia. He appears dry on exam and reports subjective decline in his urine output. 1. Renal U/S negative for hydronephrosis. UA bland. 2. Nephrology following. 3. Continue IVF, bicarb -> saline. 4. Creat Improving steadily. Good UOP. 5. Strongly recommended avoidance of NSAIDs. 6. Avoid nephrotoxins, holding home lisinopril, metformin). Renal dosing of medications  2. Metabolic Acidosis: resolved with bicarb drip, suspect 2/2 renal insufficiency. 3. Hyperkalemia: 2/2 impaired clearnce, lisinopril use, and acidosis. Initially 6.8. s/p Calcium Gluconate 2g x2 and Kayexalate 30g x2.  1. Resolved, stable. 2. Monitor. 4. HTN: Holding home Lisinopril. BP currently controlled. Monitor for now. 5. Systolic Murmur: no prior echo seen. Check echo for baseline structural eval.   6. NIDDM2: HgbA1C 6.7%. Continue SSI. Hypoglycemic protocols in place. May recommend holding of metformin on dc pending renal recovery. OK to resume SFU when renal function stable. Start alogliptin. 7. MDD: Continue Wellbutrin SR 150mg and Zoloft 150mg. On hold until improvement in renal function. 8. Hx Kidney Stones: Renal U/S reports no hydronephrosis or stones. 9. LE rash: patient states he skin picks, but thinks it is due to diabetes. Recommend dermatologic eval.  States x 3years.        Expected discharge date:  1-2 days    Disposition: Pending renal recovery. [] Home       [] TCU       [] Rehab       [] Psych       [] SNF       [] [place]       [] Other-    --------------------------------------------    Chief Complaint: Weakness, fatigue, abnormal labs     Hospital Course:   \"Patient is a 64 y/o  M with PMH NIDDM2, HTN, MDD, and Hx renal stones. He presented to [facility] ED with complaints of weakness, fatigue for 3 days and referred due to abnormal lab value. 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 He felt hot near the end of his shift and almost had a fall while trying to walk out out of his job to get home. He felt very weakn and also had middle chest soreness. Later that night, he reports muscle pain which progressively worsened the day after. He tried to hydrate without relief and spend the following day in bed. He went to [facility] to get his blood drawn and was referred to get a CXR and ECG. He then received a call the day later telling him to go the ED for further evaluation due to his abnormal labs. His last wellness check was a month ago and his renal function at the time was normal. He denies medication or lifestyle changes recently. Muscle and chest soreness has almost resolved upon his presentation to the ED. He was admitted for further evaluation and treatment.     His initial Cr was 4.8 and K 6.9. Nephrology was consulted and he was started on IVF, calcium gluconate, and kayexalate. \"      Subjective (past 24 hours):    Patient seen at bedside. He denies any acute issues. No problems with urination. Bladder scan showed PVR of 120 per RN. No fevers, chills, nausea or vomiting, dizziness or lightheadedness. States he has not moved around much but will try to get up more today.       Medications:  Reviewed    Infusion Medications    sodium chloride      dextrose       Scheduled Medications    insulin lispro  0-12 Units Subcutaneous TID     insulin lispro  0-6 Units Subcutaneous Nightly    atorvastatin  20 mg Oral Daily    [Held by provider] sertraline  150 mg Oral Daily    [Held by provider] buPROPion  150 mg Oral Daily    cyclobenzaprine  5 mg Oral BID    sodium chloride flush  10 mL Intravenous 2 times per day    heparin (porcine)  5,000 Units Subcutaneous 3 times per day     PRN Meds: dextrose, glucose, dextrose, glucagon (rDNA), sodium chloride flush, ondansetron **OR** ondansetron, polyethylene glycol, acetaminophen **OR** acetaminophen, dextrose      Intake/Output Summary (Last 24 hours) at 6/13/2021 1441  Last data filed at 6/13/2021 1207  Gross per 24 hour   Intake 4765.18 ml   Output 2250 ml   Net 2515.18 ml     Weight change:       Exam:  /83   Pulse 74   Temp 99.4 °F (37.4 °C) (Oral)   Resp 18   Ht 5' 7\" (1.702 m)   Wt 235 lb (106.6 kg)   SpO2 98%   BMI 36.81 kg/m²     General appearance: No apparent distress, well developed, appears stated age. Overwieght. Eyes:  Pupils equal, round, and reactive to light. Conjunctivae/corneas clear. HENT: Head normal in appearance. External nares normal.  Oral mucosa moist without lesions. Hearing grossly intact. Neck: Supple, with full range of motion. Trachea midline. No gross JVD appreciated. Respiratory:  Normal respiratory effort. Clear to auscultation, bilaterally without rales or wheezes or rhonchi. Cardiovascular: Normal rate, regular rhythm with normal S1/S2 . No lower extremity edema. 3/6 RENNY in LUSB  Abdomen: Soft, non-tender, non-distended with normal bowel sounds. Musculoskeletal: There is no joint swelling or tenderness. Normal tone. No abnormal movements. Skin: Warm and dry. Bilateral LE erosions/macules without drainage. Neurologic:  No focal sensory/motor deficits in the upper and lower extremities. Cranial nerves:  grossly non-focal 2-12. Psychiatric: Alert and oriented, normal insight and though content. Capillary Refill: Brisk,< 3 seconds. Peripheral Pulses: +2 palpable, equal bilaterally. Labs:   Recent Labs     06/11/21 2143 06/12/21  0557 06/13/21  0509   WBC 7.6 5.3 4.0*   HGB 13.1* 12.3* 12.1*   HCT 41.2* 38.1* 36.6*    146 141     Recent Labs     06/11/21 2143 06/12/21  0310 06/12/21  0823 06/12/21  1616 06/13/21  0509    136  --   --  141   K 6.9* 7.1* 6.2* 4.7 4.6    103  --   --  103   CO2 20* 19*  --   --  28   BUN 98* 99*  --   --  55*   CREATININE 4.8* 4.7*  --   --  2.1*   CALCIUM 9.9 9.4  --   --  9.5     Recent Labs     06/11/21 2143 06/12/21  0310 06/13/21  0509   AST 16 16 17   ALT 31 28 28   BILITOT 0.3 0.2* 0.2*   ALKPHOS 55 50 49     No results for input(s): INR in the last 72 hours. Recent Labs     06/11/21 2143   CKTOTAL 62       Microbiology:      Urinalysis:      Lab Results   Component Value Date    NITRU NEGATIVE 06/12/2021    WBCUA 0-2 06/12/2021    BACTERIA NONE SEEN 06/12/2021    RBCUA 0-2 06/12/2021    BLOODU NEGATIVE 06/12/2021    SPECGRAV 1.014 06/12/2021       Radiology:  US RENAL COMPLETE   Final Result   1. No sonographic finding for hydronephrosis. 2.  Mild elevated resistive indices in both kidneys which may be followed    up. This document has been electronically signed by: Lucila Mortimer, MD on    06/11/2021 11:48 PM          DVT prophylaxis: [] Lovenox                                  [] SCDs                                 [x] SQ Heparin                                 [] Encourage ambulation           [] Already on Anticoagulation     Code Status: Full Code    PT/OT Eval Status: n;a    Diet:   ADULT DIET; Regular; 4 carb choices (60 gm/meal);  Low Potassium (Less than 3000 mg/day); renal    Fluids: yes    Tele:   [x] yes             [] no      Electronically signed by Emilio Frank DO on 6/13/2021 at 2:41 PM

## 2021-06-13 NOTE — PLAN OF CARE
Problem: Falls - Risk of:  Goal: Will remain free from falls  Description: Will remain free from falls  6/13/2021 1234 by Alverto Duffy RN  Outcome: Ongoing  6/13/2021 1234 by Alverto Duffy RN  Outcome: Ongoing  Goal: Absence of physical injury  Description: Absence of physical injury  6/13/2021 1234 by Alverto Duffy RN  Outcome: Ongoing  6/13/2021 1234 by Alverto Duffy RN  Outcome: Ongoing     Problem: Physical Regulation:  Goal: Will remain free from infection  Description: Will remain free from infection  6/13/2021 1234 by Alverto Duffy RN  Outcome: Ongoing  6/13/2021 1234 by Alverto Duffy RN  Outcome: Ongoing  Goal: Complications related to the disease process, condition or treatment will be avoided or minimized  Description: Complications related to the disease process, condition or treatment will be avoided or minimized  6/13/2021 1234 by Alverto Duffy RN  Outcome: Ongoing  6/13/2021 1234 by Alverto Duffy RN  Outcome: Ongoing

## 2021-06-13 NOTE — PROGRESS NOTES
55*   CREATININE 4.8* 4.7*  --   --  2.1*   GLUCOSE 88 201*  --   --  203*   CALCIUM 9.9 9.4  --   --  9.5     Hepatic:   Recent Labs     06/11/21  2143 06/12/21  0310 06/13/21  0509   LABALBU 4.7 4.1 4.0   AST 16 16 17   ALT 31 28 28   BILITOT 0.3 0.2* 0.2*   ALKPHOS 55 50 49         Meds:  Infusion:    sodium bicarbonate infusion 150 mL/hr at 06/12/21 1015    dextrose       Meds:    insulin lispro  0-12 Units Subcutaneous TID WC    insulin lispro  0-6 Units Subcutaneous Nightly    atorvastatin  20 mg Oral Daily    [Held by provider] sertraline  150 mg Oral Daily    [Held by provider] buPROPion  150 mg Oral Daily    cyclobenzaprine  5 mg Oral BID    sodium chloride flush  10 mL Intravenous 2 times per day    heparin (porcine)  5,000 Units Subcutaneous 3 times per day     Meds prn: dextrose, glucose, dextrose, glucagon (rDNA), sodium chloride flush, ondansetron **OR** ondansetron, polyethylene glycol, acetaminophen **OR** acetaminophen, dextrose       Impression and Plan:  1. MARK appears to be mostly prerenal  Urine electrolytes not available  UA is benign  Ultrasound negative for obstruction  Renal function improving  Continue with IV fluids    2. Metabolic acidosis. Will stop IV bicarbonate drip at this time  3. Hyperkalemia, critical.  Resolved  4. Diabetes mellitus  5. Azotemia  6.   History of NSAID use      D/W patient     Juana Dent MD  [facility]

## 2021-06-13 NOTE — PLAN OF CARE
Problem: Falls - Risk of:  Goal: Will remain free from falls  Description: Will remain free from falls  6/12/2021 2232 by Sofie Calle RN  Outcome: Ongoing  6/12/2021 1517 by Jose C Perdomo RN  Outcome: Ongoing  Goal: Absence of physical injury  Description: Absence of physical injury  6/12/2021 2232 by Sofie Calle RN  Outcome: Ongoing  6/12/2021 1517 by Jose C Perdomo RN  Outcome: Ongoing     Problem: Physical Regulation:  Goal: Will remain free from infection  Description: Will remain free from infection  6/12/2021 2232 by Sofie Calle RN  Outcome: Ongoing  6/12/2021 1517 by Jose C Perdomo RN  Outcome: Ongoing  Goal: Complications related to the disease process, condition or treatment will be avoided or minimized  Description: Complications related to the disease process, condition or treatment will be avoided or minimized  6/12/2021 2232 by Sofie Calle RN  Outcome: Ongoing  6/12/2021 1517 by Jose C Perdomo RN  Outcome: Ongoing

## 2021-06-14 VITALS
TEMPERATURE: 98.2 F | SYSTOLIC BLOOD PRESSURE: 162 MMHG | HEART RATE: 69 BPM | DIASTOLIC BLOOD PRESSURE: 74 MMHG | OXYGEN SATURATION: 96 % | HEIGHT: 67 IN | BODY MASS INDEX: 36.88 KG/M2 | WEIGHT: 235 LBS | RESPIRATION RATE: 17 BRPM

## 2021-06-14 LAB
ANION GAP SERPL CALCULATED.3IONS-SCNC: 9 MEQ/L (ref 8–16)
BUN BLDV-MCNC: 37 MG/DL (ref 7–22)
CALCIUM SERPL-MCNC: 9 MG/DL (ref 8.5–10.5)
CHLORIDE BLD-SCNC: 105 MEQ/L (ref 98–111)
CO2: 27 MEQ/L (ref 23–33)
CREAT SERPL-MCNC: 1.7 MG/DL (ref 0.4–1.2)
GFR SERPL CREATININE-BSD FRML MDRD: 41 ML/MIN/1.73M2
GLUCOSE BLD-MCNC: 127 MG/DL (ref 70–108)
GLUCOSE BLD-MCNC: 147 MG/DL (ref 70–108)
GLUCOSE BLD-MCNC: 163 MG/DL (ref 70–108)
GLUCOSE BLD-MCNC: 179 MG/DL (ref 70–108)
LV EF: 55 %
LVEF MODALITY: NORMAL
POTASSIUM REFLEX MAGNESIUM: 4.3 MEQ/L (ref 3.5–5.2)
SODIUM BLD-SCNC: 141 MEQ/L (ref 135–145)

## 2021-06-14 PROCEDURE — 82948 REAGENT STRIP/BLOOD GLUCOSE: CPT

## 2021-06-14 PROCEDURE — 99239 HOSP IP/OBS DSCHRG MGMT >30: CPT | Performed by: PHYSICIAN ASSISTANT

## 2021-06-14 PROCEDURE — 99232 SBSQ HOSP IP/OBS MODERATE 35: CPT | Performed by: INTERNAL MEDICINE

## 2021-06-14 PROCEDURE — 6370000000 HC RX 637 (ALT 250 FOR IP): Performed by: STUDENT IN AN ORGANIZED HEALTH CARE EDUCATION/TRAINING PROGRAM

## 2021-06-14 PROCEDURE — 6370000000 HC RX 637 (ALT 250 FOR IP): Performed by: INTERNAL MEDICINE

## 2021-06-14 PROCEDURE — 6360000002 HC RX W HCPCS: Performed by: INTERNAL MEDICINE

## 2021-06-14 PROCEDURE — 93306 TTE W/DOPPLER COMPLETE: CPT

## 2021-06-14 PROCEDURE — 80048 BASIC METABOLIC PNL TOTAL CA: CPT

## 2021-06-14 PROCEDURE — 36415 COLL VENOUS BLD VENIPUNCTURE: CPT

## 2021-06-14 RX ADMIN — ALOGLIPTIN 12.5 MG: 12.5 TABLET, FILM COATED ORAL at 08:32

## 2021-06-14 RX ADMIN — ATORVASTATIN CALCIUM 20 MG: 20 TABLET, FILM COATED ORAL at 08:32

## 2021-06-14 RX ADMIN — CYCLOBENZAPRINE 5 MG: 10 TABLET, FILM COATED ORAL at 08:31

## 2021-06-14 RX ADMIN — HEPARIN SODIUM 5000 UNITS: 5000 INJECTION INTRAVENOUS; SUBCUTANEOUS at 05:46

## 2021-06-14 ASSESSMENT — PAIN SCALES - GENERAL
PAINLEVEL_OUTOF10: 0
PAINLEVEL_OUTOF10: 0

## 2021-06-14 NOTE — PLAN OF CARE
Problem: Falls - Risk of:  Goal: Will remain free from falls  Description: Will remain free from falls  6/13/2021 2241 by Jacki Posadas RN  Outcome: Ongoing  6/13/2021 1234 by Rosalia Hensley RN  Outcome: Ongoing  Goal: Absence of physical injury  Description: Absence of physical injury  6/13/2021 2241 by Jacki Posadas RN  Outcome: Ongoing  6/13/2021 1234 by Rosalia Hensley RN  Outcome: Ongoing     Problem: Physical Regulation:  Goal: Will remain free from infection  Description: Will remain free from infection  6/13/2021 2241 by Jacki Posadas RN  Outcome: Ongoing  6/13/2021 1234 by Rosalia Hensley RN  Outcome: Ongoing  Goal: Complications related to the disease process, condition or treatment will be avoided or minimized  Description: Complications related to the disease process, condition or treatment will be avoided or minimized  6/13/2021 2241 by Jacki Posadas RN  Outcome: Ongoing  6/13/2021 1234 by Rosalia Hensley RN  Outcome: Ongoing

## 2021-06-14 NOTE — PLAN OF CARE
Problem: Falls - Risk of:  Goal: Will remain free from falls  Description: Will remain free from falls  6/14/2021 0934 by Rita Rice RN  Outcome: Ongoing  6/13/2021 2241 by Nuno Katz RN  Outcome: Ongoing  6/13/2021 2241 by Nuno Katz RN  Outcome: Ongoing  Goal: Absence of physical injury  Description: Absence of physical injury  6/14/2021 0934 by Rita Lyons RN  Outcome: Ongoing  6/13/2021 2241 by Nuno Katz RN  Outcome: Ongoing  6/13/2021 2241 by Nuno Katz RN  Outcome: Ongoing     Problem: Physical Regulation:  Goal: Will remain free from infection  Description: Will remain free from infection  6/14/2021 0934 by Rita Lyons RN  Outcome: Ongoing  6/13/2021 2241 by Nuno Katz RN  Outcome: Ongoing  6/13/2021 2241 by Nuno Katz RN  Outcome: Ongoing  Goal: Complications related to the disease process, condition or treatment will be avoided or minimized  Description: Complications related to the disease process, condition or treatment will be avoided or minimized  6/14/2021 0934 by Obdulio Mon RN  Outcome: Ongoing  6/13/2021 2241 by Nuno Katz RN  Outcome: Ongoing  6/13/2021 2241 by Nuno Katz RN  Outcome: Ongoing

## 2021-06-14 NOTE — DISCHARGE INSTR - COC
Continuity of Care Form    Patient Name: Graciela Bhat   :  1955  MRN:  [id number]    Admit date:  2021  Discharge date:  ***    Code Status Order: Full Code   Advance Directives:   Advance Care Flowsheet Documentation     Date/Time Healthcare Directive Type of Healthcare Directive Copy in [address] Agent's Name Healthcare Agent's Phone Number    [phone]  No, patient does not have an advance directive for healthcare treatment -- -- -- -- --          Admitting Physician:  No admitting provider for patient encounter. PCP: Burton Lanes, APRN - CNP    Discharging Nurse: [facility] Unit/Room#: 6K-03/003-A  Discharging Unit Phone Number: ***    Emergency Contact:   Extended Emergency Contact Information  Primary Emergency Contact: o,o  Home Phone: [phone]  Mobile Phone: [phone]  Relation: Other   needed? No  Secondary Emergency Contact: Scott De La O  Relation: Spouse   needed? No    Past Surgical History:  No past surgical history on file. Immunization History: There is no immunization history on file for this patient.     Active Problems:  Patient Active Problem List   Diagnosis Code    Acute kidney injury ([facility] [address] N17.9    Hyperkalemia H80.9    Metabolic acidosis V99.2    Prerenal azotemia R79.89       Isolation/Infection:   Isolation          No Isolation        Patient Infection Status     None to display          Nurse Assessment:  Last Vital Signs: BP (!) 162/74   Pulse 69   Temp 98.2 °F (36.8 °C) (Oral)   Resp 17   Ht 5' 7\" (1.702 m)   Wt 235 lb (106.6 kg)   SpO2 96%   BMI 36.81 kg/m²     Last documented pain score (0-10 scale): Pain Level: 0  Last Weight:   Wt Readings from Last 1 Encounters:   21 235 lb (106.6 kg)     Mental Status:  {IP PT MENTAL STATUS:16361}    IV Access:  {[facility] [place]ESS:582751813}    Nursing Mobility/ADLs:  Walking   {[facility] XGLJ:478031824}  Transfer  {[facility] DME Unplanned Readmission:  14           Discharging to Facility/ Agency   · Name:   · Address:  · Phone:  · Fax:    Dialysis Facility (if applicable)   · Name:  · Address:  · Dialysis Schedule:  · Phone:  · Fax:    / signature: {Esignature:548254349}    PHYSICIAN SECTION    Prognosis: {Prognosis:1322108424}    Condition at Discharge: Linda Martinez Patient Condition:582412751}    Rehab Potential (if transferring to Rehab): {Prognosis:4341997738}    Recommended Labs or Other Treatments After Discharge: ***    Physician Certification: I certify the above information and transfer of Carey Tellez  is necessary for the continuing treatment of the diagnosis listed and that he requires {Admit to Appropriate Level of Care:07663} for {GREATER/LESS:993896731} 30 days.      Update Admission H&P: [facility]}    PHYSICIAN SIGNATURE:  {Esignature:005199145}

## 2021-06-14 NOTE — PROGRESS NOTES
CLINICAL PHARMACY: DISCHARGE MED RECONCILIATION/REVIEW    [facility]) Select Patient?: No  Total # of Interventions Recommended: 2 -   - Updated Order #: 2   -   Total # Interventions Accepted: 1  Intervention Severity:   - Level 1 Intervention Present?: No   - Level 2 #: 0   - Level 3 #: 1   Time Spent (min): 30    Additional Documentation: Filled out AVS with next doses.     Eriberto Pereira, PharmD   6/14/2021, 2:35 PM

## 2021-06-15 NOTE — DISCHARGE SUMMARY
Hospitalist Discharge Summary        Patient: Bishnu Benson  YOB: 1955  MRN: [id number]   Acct: [de-identified]    Primary Care Physician: CHANTELLE Umana - CNP    Admit date  6/11/2021    Discharge date:  6/14/2021  Chief Complaint on presentation :-  Hyperkalemia     Discharge Assessment and Plan:-   1. MARK: Nephrology consulted; okay with DC and follow up in 4 weeks. Likely pre-renal in nature. 1. Renal U/S negative for hydronephrosis. UA bland. 2. Creat Improved steadily. Good UOP. Creatinine at DC 1.7.   3. Strongly recommended avoidance of NSAIDs. 4. Avoid nephrotoxins (After speaking with Nephrology, decision was made to restart Metformin at DC but held Lisinopril). Renal dosing of medications  2. Metabolic Acidosis: resolved with bicarb drip, suspect 2/2 renal insufficiency. 3. Hyperkalemia: 2/2 impaired clearnce, lisinopril use, and acidosis. Initially 6.8. s/p Calcium Gluconate 2g x2 and Kayexalate 30g x2.  1. Resolved, stable. 4. HTN: Holding home Lisinopril until seen by Nephrology as an OP. BP currently controlled. 5. Systolic Murmur: Echo was WNL. 6. NIDDM2: HgbA1C 6.7%. Restarted Metformin on DC. 7. MDD: Continue Wellbutrin SR 150mg and Zoloft 150mg. 8. Hx Kidney Stones: Renal U/S reports no hydronephrosis or stones. 9. LE rash: patient states he skin picks, but thinks it is due to diabetes. Recommend dermatologic eval.  States x 3years. Initial H and P and Hospital course:-  Initial H&P \"Patient is a 64 y/o  M with PMH NIDDM2, HTN, MDD, and Hx renal stones. He presented to [facility] ED with complaints of weakness, fatigue for 3 days and referred due to abnormal lab value. 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 He felt hot near the end of his shift and almost had a fall while trying to walk out out of his job to get home.  He felt very weakn and also had middle chest soreness. Later that night, he reports muscle pain which progressively worsened the day after. He tried to hydrate without relief and spend the following day in bed. He went to [address] [place] [address] to get his blood drawn and was referred to get a CXR and ECG. He then received a call the day later telling him to go the ED for further evaluation due to his abnormal labs. His last wellness check was a month ago and his renal function at the time was normal. He denies medication or lifestyle changes recently. Muscle and chest soreness has almost resolved upon his presentation to the ED. He was admitted for further evaluation and treatment.     His initial Cr was 4.8 and K 6.9. Nephrology was consulted and he was started on IVF, calcium gluconate, and kayexalate. \"     Pt's creatinine and hyperkalemia continued to steadily improve. Pt was up and walking in the halls at [place]. On the day of discharge, it was explained to the patient that it was very important to follow up with his PCP and Nephrology to have continued care. Appointments were made and information was given. Physical Exam:-  Vitals:   Patient Vitals for the past 24 hrs:   BP Temp Temp src Pulse Resp SpO2   06/14/21 1528 (!) 162/74 98.2 °F (36.8 °C) Oral 69 17 96 %   06/14/21 1259 (!) 145/75 98.1 °F (36.7 °C) Oral 71 17 97 %   06/14/21 0830 139/63 97.3 °F (36.3 °C) Oral 84 18 95 %     Weight:   Weight: 235 lb (106.6 kg)   24 hour intake/output:     Intake/Output Summary (Last 24 hours) at 6/15/2021 0746  Last data filed at 6/14/2021 1648  Gross per 24 hour   Intake 600 ml   Output 500 ml   Net 100 ml       1. General appearance: No apparent distress, appears stated age and cooperative. 2. HEENT: Normal cephalic, atraumatic without obvious deformity. Pupils equal, round, and reactive to light. Extra ocular muscles intact. Conjunctivae/corneas clear. 3. Neck: Supple, with full range of motion. No jugular venous distention.  Trachea midline. 4. Respiratory:  Normal respiratory effort. Clear to auscultation, bilaterally without Rales/Wheezes/Rhonchi. 5. Cardiovascular: Regular rate and rhythm with normal S1/S2 without murmurs, rubs or gallops. 6. Abdomen: Soft, non-tender, non-distended with normal bowel sounds. 7. Musculoskeletal:  No clubbing, cyanosis or edema bilaterally. 8. Skin: Skin color, texture, turgor normal.  No rashes or lesions. 9. Neurologic:  Neurovascularly intact without any focal sensory/motor deficits. Cranial nerves: II-XII intact, grossly non-focal.  10. Psychiatric: Alert and oriented, thought content appropriate, normal insight  11. Capillary Refill: Brisk,< 3 seconds   12.  Peripheral Pulses: +2 palpable, equal bilaterally       Discharge Medications:-      Medication List      CONTINUE taking these medications    buPROPion 150 MG extended release tablet  Commonly known as: WELLBUTRIN SR     Cholecalciferol 10 MCG (400 UNIT) Caps Capsule  Commonly known as: Vitamin D     Chromium Picolinate 500 MCG Caps     cyclobenzaprine 10 MG tablet  Commonly known as: FLEXERIL     FLAXSEED OIL PO     glipiZIDE 10 MG tablet  Commonly known as: GLUCOTROL     Krill Oil 500 MG Caps     metFORMIN 1000 MG tablet  Commonly known as: GLUCOPHAGE     mupirocin 2 % ointment  Commonly known as: BACTROBAN     sertraline 100 MG tablet  Commonly known as: ZOLOFT     simvastatin 40 MG tablet  Commonly known as: ZOCOR     therapeutic multivitamin-minerals tablet        STOP taking these medications    lisinopril 20 MG tablet  Commonly known as: PRINIVIL;ZESTRIL  Notes to patient: Stopping because of kidney injury             Labs :-  Recent Results (from the past 72 hour(s))   Potassium    Collection Time: 06/12/21  8:23 AM   Result Value Ref Range    Potassium 6.2 (HH) 3.5 - 5.2 meq/L   POCT glucose    Collection Time: 06/12/21 11:13 AM   Result Value Ref Range    POC Glucose 116 (H) 70 - 108 mg/dl   Urinalysis with microscopic    Collection Time: 06/12/21  2:10 PM   Result Value Ref Range    Glucose, Urine NEGATIVE NEGATIVE mg/dl    Bilirubin Urine NEGATIVE NEGATIVE    Ketones, Urine NEGATIVE NEGATIVE    Specific Gravity, UA 1.014 1.002 - 1.030    Blood, Urine NEGATIVE NEGATIVE    pH, UA 6.5 5.0 - 9.0    Protein, UA NEGATIVE NEGATIVE mg/dl    Urobilinogen, Urine 0.2 0.0 - 1.0 eu/dl    Nitrite, Urine NEGATIVE NEGATIVE    Leukocyte Esterase, Urine NEGATIVE NEGATIVE    Color, UA YELLOW YELLOW-STRAW    Character, Urine CLEAR CLR-SL.CLOUD    RBC, UA 0-2 0-2/hpf /hpf    WBC, UA 0-2 0-4/hpf /hpf    Epithelial Cells, UA NONE SEEN 3-5/hpf /hpf    Bacteria, UA NONE SEEN FEW/NONE SEEN    Casts NONE SEEN NONE SEEN /lpf    Crystals NONE SEEN NONE SEEN    Renal Epithelial, UA NONE SEEN NONE SEEN    Yeast, UA NONE SEEN NONE SEEN    Casts NONE SEEN /lpf    Miscellaneous Lab Test Result NONE SEEN    Potassium    Collection Time: 06/12/21  4:16 PM   Result Value Ref Range    Potassium 4.7 3.5 - 5.2 meq/L   Lactic acid, plasma    Collection Time: 06/12/21  4:16 PM   Result Value Ref Range    Lactic Acid 1.2 0.5 - 2.0 mmol/L   POCT glucose    Collection Time: 06/12/21  4:20 PM   Result Value Ref Range    POC Glucose 175 (H) 70 - 108 mg/dl   POCT glucose    Collection Time: 06/12/21  8:36 PM   Result Value Ref Range    POC Glucose 135 (H) 70 - 108 mg/dl   CBC    Collection Time: 06/13/21  5:09 AM   Result Value Ref Range    WBC 4.0 (L) 4.8 - 10.8 thou/mm3    RBC 4.15 (L) 4.70 - 6.10 mill/mm3    Hemoglobin 12.1 (L) 14.0 - 18.0 gm/dl    Hematocrit 36.6 (L) 42.0 - 52.0 %    MCV 88.2 80.0 - 94.0 fL    MCH 29.2 26.0 - 33.0 pg    MCHC 33.1 32.2 - 35.5 gm/dl    RDW-CV 12.9 11.5 - 14.5 %    RDW-SD 41.8 35.0 - 45.0 fL    Platelets 934 565 - 162 thou/mm3    MPV 10.1 9.4 - 12.4 fL   Comprehensive Metabolic Panel w/ Reflex to MG    Collection Time: 06/13/21  5:09 AM   Result Value Ref Range    Glucose 203 (H) 70 - 108 mg/dL    CREATININE 2.1 (H) 0.4 - 1.2 mg/dL    BUN 55 (H) 7 - 22 mg/dL    Sodium 141 135 - 145 meq/L    Potassium reflex Magnesium 4.6 3.5 - 5.2 meq/L    Chloride 103 98 - 111 meq/L    CO2 28 23 - 33 meq/L    Calcium 9.5 8.5 - 10.5 mg/dL    AST 17 5 - 40 U/L    Alkaline Phosphatase 49 38 - 126 U/L    Total Protein 6.9 6.1 - 8.0 g/dL    Albumin 4.0 3.5 - 5.1 g/dL    Total Bilirubin 0.2 (L) 0.3 - 1.2 mg/dL    ALT 28 11 - 66 U/L   Anion Gap    Collection Time: 06/13/21  5:09 AM   Result Value Ref Range    Anion Gap 10.0 8.0 - 16.0 meq/L   Glomerular Filtration Rate, Estimated    Collection Time: 06/13/21  5:09 AM   Result Value Ref Range    Est, Glom Filt Rate 32 (A) ml/min/1.73m2   POCT glucose    Collection Time: 06/13/21  6:07 AM   Result Value Ref Range    POC Glucose 212 (H) 70 - 108 mg/dl   POCT glucose    Collection Time: 06/13/21 11:14 AM   Result Value Ref Range    POC Glucose 181 (H) 70 - 108 mg/dl   POCT glucose    Collection Time: 06/13/21  4:23 PM   Result Value Ref Range    POC Glucose 124 (H) 70 - 108 mg/dl   POCT glucose    Collection Time: 06/13/21  7:42 PM   Result Value Ref Range    POC Glucose 156 (H) 70 - 108 mg/dl   Basic Metabolic Panel w/ Reflex to MG    Collection Time: 06/14/21  5:21 AM   Result Value Ref Range    Sodium 141 135 - 145 meq/L    Potassium reflex Magnesium 4.3 3.5 - 5.2 meq/L    Chloride 105 98 - 111 meq/L    CO2 27 23 - 33 meq/L    Glucose 163 (H) 70 - 108 mg/dL    BUN 37 (H) 7 - 22 mg/dL    CREATININE 1.7 (H) 0.4 - 1.2 mg/dL    Calcium 9.0 8.5 - 10.5 mg/dL   Anion Gap    Collection Time: 06/14/21  5:21 AM   Result Value Ref Range    Anion Gap 9.0 8.0 - 16.0 meq/L   Glomerular Filtration Rate, Estimated    Collection Time: 06/14/21  5:21 AM   Result Value Ref Range    Est, Glom Filt Rate 41 (A) ml/min/1.73m2   POCT glucose    Collection Time: 06/14/21  6:42 AM   Result Value Ref Range    POC Glucose 147 (H) 70 - 108 mg/dl   POCT glucose    Collection Time: 06/14/21 11:33 AM   Result Value Ref Range    POC Glucose 179 (H) 70 - 108 mg/dl   POCT

## 2021-06-23 LAB
BUN BLDV-MCNC: 34 MG/DL
CALCIUM SERPL-MCNC: 9.8 MG/DL
CHLORIDE BLD-SCNC: 101 MMOL/L
CO2: 26 MMOL/L
CREAT SERPL-MCNC: 1.5 MG/DL
GFR CALCULATED: 50
GLUCOSE BLD-MCNC: 170 MG/DL
POTASSIUM SERPL-SCNC: 5.5 MMOL/L
SODIUM BLD-SCNC: 138 MMOL/L

## 2021-07-13 ENCOUNTER — OFFICE VISIT (OUTPATIENT)
Dept: NEPHROLOGY | Age: 66
End: 2021-07-13
Payer: COMMERCIAL

## 2021-07-13 VITALS
HEART RATE: 76 BPM | WEIGHT: 236 LBS | BODY MASS INDEX: 36.96 KG/M2 | SYSTOLIC BLOOD PRESSURE: 148 MMHG | DIASTOLIC BLOOD PRESSURE: 90 MMHG | OXYGEN SATURATION: 98 %

## 2021-07-13 DIAGNOSIS — N18.31 STAGE 3A CHRONIC KIDNEY DISEASE (HCC): ICD-10-CM

## 2021-07-13 DIAGNOSIS — E87.5 HYPERKALEMIA: ICD-10-CM

## 2021-07-13 DIAGNOSIS — R33.9 INCOMPLETE BLADDER EMPTYING: ICD-10-CM

## 2021-07-13 DIAGNOSIS — I10 ESSENTIAL HYPERTENSION: ICD-10-CM

## 2021-07-13 DIAGNOSIS — N17.9 AKI (ACUTE KIDNEY INJURY) (HCC): Primary | ICD-10-CM

## 2021-07-13 PROBLEM — E78.2 MIXED HYPERLIPIDEMIA: Status: ACTIVE | Noted: 2021-07-13

## 2021-07-13 PROBLEM — F32.5 MAJOR DEPRESSION IN COMPLETE REMISSION (HCC): Status: ACTIVE | Noted: 2021-07-13

## 2021-07-13 PROCEDURE — 99214 OFFICE O/P EST MOD 30 MIN: CPT | Performed by: INTERNAL MEDICINE

## 2021-07-13 RX ORDER — REPAGLINIDE 2 MG/1
2 TABLET ORAL
COMMUNITY
End: 2022-11-01

## 2021-07-13 RX ORDER — CARVEDILOL 3.12 MG/1
3.12 TABLET ORAL 2 TIMES DAILY
Qty: 60 TABLET | Refills: 3 | Status: SHIPPED
Start: 2021-07-13 | End: 2021-09-07 | Stop reason: DRUGHIGH

## 2021-07-13 NOTE — PROGRESS NOTES
[facility] [place] [facility]  [facility]  [address] [place] [address]  Dept: [phone]  Loc: [phone]  Office Progress Note  7/13/2021 9:57 AM      Pt Name:    Sesar Guillen  YOB: 1955  Primary Care Physician:  HCANTELLE Montelongo CNP     Chief Complaint:   Chief Complaint   Patient presents with    Follow-up     4 week [facility] for MARK        Background Information/Interval History:   Pt is a 62 yo white male with hx DM, HTN, hx kidney stone who was recently in hospital with MARK and hyperkalemia in setting of NSAIDs. He reports he was taking a lot of ibuprofen sometimes couple a day and sometimes three times a day. No longer doing so. Was managed supportively for MARK. Doing better now. No chest pain or shortness of breath. He does not check sugars at home. No nausea, vomiting or diarrhea. No leg swelling. No chest pain or shortness of breath. Does not regularly monitor BP but does check intermittently. He feels well today. VITALS:  BP (!) 148/90 (Site: Left Upper Arm, Position: Sitting, Cuff Size: Medium Adult)   Pulse 76   Wt 236 lb (107 kg)   SpO2 98%   BMI 36.96 kg/m²   Wt Readings from Last 3 Encounters:   07/13/21 236 lb (107 kg)   06/11/21 235 lb (106.6 kg)     Body mass index is 36.96 kg/m².      General Appearance: alert and cooperative with exam, appears comfortable, no distress  Oral: moist oral mucus membranes  Neck: No jugular venous distention  Lungs: Air entry B/L, no crackles or rales, no use of accessory muscles  Heart: S1, S2 heard  GI: soft, non-tender, no guarding  Extremities: No sig LE edema     Medications:  Current Outpatient Medications   Medication Sig Dispense Refill    repaglinide (PRANDIN) 2 MG tablet Take 2 mg by mouth 2 times daily (before meals)      Cholecalciferol (VITAMIN D) 10 MCG (400 UNIT) CAPS Capsule Take 400 Units by mouth daily      Chromium Picolinate 500 MCG CAPS Take 500 mcg/day by mouth daily      Flaxseed, Linseed, (FLAXSEED OIL PO) Take 1,000 mg by mouth 2 times daily      Krill Oil 500 MG CAPS Take 500 mg by mouth 2 times daily      Multiple Vitamins-Minerals (THERAPEUTIC MULTIVITAMIN-MINERALS) tablet Take 1 tablet by mouth daily      mupirocin (BACTROBAN) 2 % ointment Apply topically 2 times daily Apply topically 3 times daily prn      buPROPion (WELLBUTRIN SR) 150 MG extended release tablet Take 150 mg by mouth daily      metFORMIN (GLUCOPHAGE) 1000 MG tablet Take 1,000 mg by mouth 2 times daily (with meals)      sertraline (ZOLOFT) 100 MG tablet Take 150 mg by mouth daily      simvastatin (ZOCOR) 40 MG tablet Take 40 mg by mouth nightly      cyclobenzaprine (FLEXERIL) 10 MG tablet Take 10 mg by mouth 2 times daily      glipiZIDE (GLUCOTROL) 10 MG tablet Take 10 mg by mouth daily       No current facility-administered medications for this visit. Laboratory & Diagnostics:  Old labs  June 2021: K 5.5, Creat 1.5, Glucose 170  UA: no blood, no protein  US: no hydronephrosis, Prevoid 478 ml     Impression/Plan:   1. MARK on CKD III: MARK has resolved. Creat is down to 1.5, ?new baseline. Peak 4.8 on admission. Check UPCR. Discussed in detail--need better sugars control to minimize progression of CKD. Also advised to avoid NSAIDs   2. Hyperkalemia: eating bananas and taking sports drinks. Advised against these. Discussed low K diet. Check K again this Thursday. Discussed with patient. 3. HTN: start coreg 3.125 mg po BID. Advised to bring me a log of BP readings and bring next visit. Will avoid ACEI until potassium is normalized. Will benefit with ACEI however in long term once potassium has been corrected. This was discussed with patient and wife in detail. 4. ? ? Urinary retention/Incomplete bladder emptying: advised double voiding. Will check post void residual. May need to add Flomax depending on test results.   Could also consider urology evaluation depending on bladder ultrasound. Orders Placed This Encounter   Procedures    US URINARY BLADDER LIMITED    Potassium    Basic Metabolic Panel    PTH, Intact    Phosphorus    Hemoglobin and Hematocrit, Blood    Vitamin D 25 Hydroxy    Protein / creatinine ratio, urine     Return in about 2 months (around 9/13/2021).     Tiago Carlton MD  [facility]

## 2021-07-16 LAB — POTASSIUM (K+): 4.6

## 2021-07-21 DIAGNOSIS — R33.9 INCOMPLETE BLADDER EMPTYING: ICD-10-CM

## 2021-07-27 ENCOUNTER — TELEPHONE (OUTPATIENT)
Dept: NEPHROLOGY | Age: 66
End: 2021-07-27

## 2021-09-01 LAB
BUN BLDV-MCNC: 23 MG/DL
CALCIUM SERPL-MCNC: 10.1 MG/DL
CHLORIDE BLD-SCNC: 98 MMOL/L
CO2: 28 MMOL/L
CREAT SERPL-MCNC: 1.3 MG/DL
GFR CALCULATED: 58
GLUCOSE BLD-MCNC: 206 MG/DL
HCT VFR BLD CALC: 39 % (ref 41–53)
HEMOGLOBIN: 13.5 G/DL (ref 13.5–17.5)
PHOSPHORUS: 3.6 MG/DL
POTASSIUM SERPL-SCNC: 4.5 MMOL/L
PTH INTACT: 26
SODIUM BLD-SCNC: 136 MMOL/L
VITAMIN D 25-HYDROXY: 50
VITAMIN D2, 25 HYDROXY: NORMAL
VITAMIN D3,25 HYDROXY: NORMAL

## 2021-09-07 ENCOUNTER — OFFICE VISIT (OUTPATIENT)
Dept: NEPHROLOGY | Age: 66
End: 2021-09-07
Payer: COMMERCIAL

## 2021-09-07 VITALS
BODY MASS INDEX: 37.75 KG/M2 | OXYGEN SATURATION: 96 % | SYSTOLIC BLOOD PRESSURE: 143 MMHG | HEART RATE: 73 BPM | WEIGHT: 241 LBS | DIASTOLIC BLOOD PRESSURE: 90 MMHG

## 2021-09-07 DIAGNOSIS — E87.5 HYPERKALEMIA: ICD-10-CM

## 2021-09-07 DIAGNOSIS — I12.9 HYPERTENSIVE RENAL DISEASE, STAGE 1 THROUGH STAGE 4 OR UNSPECIFIED CHRONIC KIDNEY DISEASE: ICD-10-CM

## 2021-09-07 DIAGNOSIS — N18.31 STAGE 3A CHRONIC KIDNEY DISEASE (HCC): Primary | ICD-10-CM

## 2021-09-07 PROCEDURE — 99213 OFFICE O/P EST LOW 20 MIN: CPT | Performed by: INTERNAL MEDICINE

## 2021-09-07 RX ORDER — CARVEDILOL 6.25 MG/1
6.25 TABLET ORAL 2 TIMES DAILY
Qty: 180 TABLET | Refills: 1 | Status: SHIPPED | OUTPATIENT
Start: 2021-09-07 | End: 2022-03-07 | Stop reason: SDUPTHER

## 2021-09-07 NOTE — PROGRESS NOTES
[facility] [place] [facility]  [facility]  [address] [place] [address]  Dept: [phone]  Loc: [phone]  Office Progress Note  9/7/2021 10:06 AM      Pt Name:    Sammy Brown  YOB: 1955  Primary Care Physician:  Catherine Grey, CHANTELLE - CNP     Chief Complaint:   Chief Complaint   Patient presents with    Follow-up     Chief Complaint   Patient presents with    Other     mark and CKD             Background Information/Interval History:   Pt is a 62 yo white male with hx DM, HTN, hx kidney stone who was recently in hospital with MARK and hyperkalemia in setting of NSAIDs. He reports he was taking a lot of ibuprofen sometimes couple a day and sometimes three times a day. No longer doing so. Was managed supportively for MARK. I saw him about 2 months ago, he is here for follow-up. No urinary complaints. He is on coreg and takes metformin and glucotrol for DM. He fell about 2 weeks ago. He says he went to occupational health. He says he was told he had fracture of 9th Right rib. He was given pain pills which he is no longer taking at this time. Pain is tolerable and says it is \"0\" sitting here. VITALS:  BP (!) 143/90 (Site: Right Upper Arm, Position: Sitting, Cuff Size: Large Adult)   Pulse 73   Wt 241 lb (109.3 kg)   SpO2 96%   BMI 37.75 kg/m²   Wt Readings from Last 3 Encounters:   09/07/21 241 lb (109.3 kg)   07/13/21 236 lb (107 kg)   06/11/21 235 lb (106.6 kg)     Body mass index is 37.75 kg/m².      General Appearance: alert and cooperative with exam, appears comfortable, no distress  Oral: moist oral mucus membranes  Neck: No jugular venous distention  Lungs: Air entry B/L, no crackles or rales, no use of accessory muscles  Heart: S1, S2 heard  GI: soft, non-tender, no guarding  Extremities: No sig LE edema     Medications:  Current Outpatient Medications   Medication Sig Dispense Refill    repaglinide (PRANDIN) 2 MG tablet Take 2 mg by mouth 2 times daily (before meals)      carvedilol (COREG) 3.125 MG tablet Take 1 tablet by mouth 2 times daily 60 tablet 3    Cholecalciferol (VITAMIN D) 10 MCG (400 UNIT) CAPS Capsule Take 400 Units by mouth daily      Chromium Picolinate 500 MCG CAPS Take 500 mcg/day by mouth daily      Flaxseed, Linseed, (FLAXSEED OIL PO) Take 1,000 mg by mouth 2 times daily      Krill Oil 500 MG CAPS Take 500 mg by mouth 2 times daily      Multiple Vitamins-Minerals (THERAPEUTIC MULTIVITAMIN-MINERALS) tablet Take 1 tablet by mouth daily      mupirocin (BACTROBAN) 2 % ointment Apply topically 2 times daily Apply topically 3 times daily prn      buPROPion (WELLBUTRIN SR) 150 MG extended release tablet Take 150 mg by mouth daily      metFORMIN (GLUCOPHAGE) 1000 MG tablet Take 1,000 mg by mouth 2 times daily (with meals)      sertraline (ZOLOFT) 100 MG tablet Take 150 mg by mouth daily      simvastatin (ZOCOR) 40 MG tablet Take 40 mg by mouth nightly      cyclobenzaprine (FLEXERIL) 10 MG tablet Take 10 mg by mouth 2 times daily      glipiZIDE (GLUCOTROL) 10 MG tablet Take 10 mg by mouth daily       No current facility-administered medications for this visit. Laboratory & Diagnostics:  Old labs  June 2021: K 5.5, Creat 1.5, Glucose 170  UA: no blood, no protein  US: no hydronephrosis, Prevoid 478 ml    Sept 2021: K 4.5, Creat 1.3, Ca 10.1, Hb 13.5, PTH 26, Vit d 50, UPCR 100 mg/g       Impression/Plan:   1. MARK on CKD III: MARK has resolved. Stable CKD. At baseline. Creat is down to 1.5. At baseline. UPCr is 100 mg/g. Recent US showed 478 ml prevoid urine. Had post void US which was okay. 2. Hyperkalemia: resolved. 3. HTN: on coreg 3.125 mg BID. He says BP is 140/85 at home. Increase coreg 6.25 mg BID. Advised to call with some blood pressure readings in 1 to 2 weeks  4. DM: on metformin and glucotrol    Overall stable renal function.      Orders Placed This Encounter   Procedures    Basic Metabolic Panel    Protein / creatinine ratio, urine    Potassium     Return in about 1 year (around 9/7/2022).     Scott Miranda MD  [facility]

## 2021-09-07 NOTE — PROGRESS NOTES
Had a fall on 8-31 - was put on pain pills but off now - had swelling in legs and feet but better now

## 2021-12-08 LAB — POTASSIUM (K+): 4.8

## 2022-03-07 RX ORDER — CARVEDILOL 6.25 MG/1
6.25 TABLET ORAL 2 TIMES DAILY
Qty: 180 TABLET | Refills: 3 | Status: SHIPPED | OUTPATIENT
Start: 2022-03-07

## 2022-10-24 ENCOUNTER — TELEPHONE (OUTPATIENT)
Dept: NEPHROLOGY | Age: 67
End: 2022-10-24

## 2022-10-24 NOTE — TELEPHONE ENCOUNTER
I called to confirm Melecio's appoitment. I ended up speaking to his wife. She said they both have bronchitis and he is on an antibiotic. He will go to [facility] and get labs done tomorrow anyway. They wanted us to know that his labs may be off a little.

## 2022-10-25 LAB
BUN BLDV-MCNC: 29 MG/DL
CALCIUM SERPL-MCNC: 10 MG/DL
CHLORIDE BLD-SCNC: 98 MMOL/L
CO2: 26 MMOL/L
CREAT SERPL-MCNC: 1.1 MG/DL
GFR CALCULATED: 75
GLUCOSE BLD-MCNC: 130 MG/DL
POTASSIUM SERPL-SCNC: 4.9 MMOL/L
SODIUM BLD-SCNC: 138 MMOL/L

## 2022-11-01 ENCOUNTER — OFFICE VISIT (OUTPATIENT)
Dept: NEPHROLOGY | Age: 67
End: 2022-11-01
Payer: MEDICARE

## 2022-11-01 VITALS
OXYGEN SATURATION: 98 % | DIASTOLIC BLOOD PRESSURE: 80 MMHG | SYSTOLIC BLOOD PRESSURE: 142 MMHG | BODY MASS INDEX: 36.49 KG/M2 | WEIGHT: 233 LBS | HEART RATE: 65 BPM

## 2022-11-01 DIAGNOSIS — I12.9 HYPERTENSIVE RENAL DISEASE, STAGE 1 THROUGH STAGE 4 OR UNSPECIFIED CHRONIC KIDNEY DISEASE: ICD-10-CM

## 2022-11-01 DIAGNOSIS — N18.31 CHRONIC KIDNEY DISEASE, STAGE 3A (HCC): Primary | ICD-10-CM

## 2022-11-01 PROBLEM — I50.9 CONGESTIVE HEART FAILURE (HCC): Status: ACTIVE | Noted: 2022-11-01

## 2022-11-01 PROCEDURE — 99213 OFFICE O/P EST LOW 20 MIN: CPT | Performed by: INTERNAL MEDICINE

## 2022-11-01 PROCEDURE — 3078F DIAST BP <80 MM HG: CPT | Performed by: INTERNAL MEDICINE

## 2022-11-01 PROCEDURE — 3074F SYST BP LT 130 MM HG: CPT | Performed by: INTERNAL MEDICINE

## 2022-11-01 NOTE — PROGRESS NOTES
[facility] [place] [facility]  [facility]  [address] [place] [address]  Dept: [phone]  Loc: [phone]  Office Progress Note  11/1/2022 10:23 AM      Pt Name:    Chad Raines  YOB: 1955  Primary Care Physician:  Etienne Low, APRN - CNP     Chief Complaint:   Chief Complaint   Patient presents with    Follow-up     Chief Complaint   Patient presents with    Other     CKD             Background Information/Interval History:   Pt is a 60 yo white male with hx DM, HTN, hx kidney stone who was recently in hospital with MARK and hyperkalemia in setting of NSAIDs. He reports he was taking a lot of ibuprofen sometimes couple a day and sometimes three times a day. No longer doing so. Was managed supportively for MARK. Here for 12 months follow-up. Feels okay. No new complaints. Sugars are okay. BP is stable as well. He is on coreg and takes metformin and glucotrol for DM. VITALS:  BP (!) 142/80 (Site: Right Upper Arm, Position: Sitting, Cuff Size: Medium Adult)   Pulse 65   Wt 233 lb (105.7 kg)   SpO2 98%   BMI 36.49 kg/m²   Wt Readings from Last 3 Encounters:   11/01/22 233 lb (105.7 kg)   09/07/21 241 lb (109.3 kg)   07/13/21 236 lb (107 kg)     Body mass index is 36.49 kg/m².      General Appearance: alert and cooperative with exam, appears comfortable, no distress  Oral: moist oral mucus membranes  Neck: No jugular venous distention  Lungs: Air entry B/L, no crackles or rales, no use of accessory muscles  Heart: S1, S2 heard  GI: soft, non-tender, no guarding  Extremities: No sig LE edema     Medications:  Current Outpatient Medications   Medication Sig Dispense Refill    Turmeric (QC TUMERIC COMPLEX PO) Take by mouth      Acetaminophen (TYLENOL 8 HOUR PO) Take by mouth      carvedilol (COREG) 6.25 MG tablet Take 1 tablet by mouth 2 times daily 180 tablet 3    Cholecalciferol (VITAMIN D) 10 MCG (400 UNIT) CAPS Capsule Take 400 Units by mouth daily      Chromium Picolinate 500 MCG CAPS Take 500 mcg/day by mouth daily      Flaxseed, Linseed, (FLAXSEED OIL PO) Take 1,000 mg by mouth 2 times daily      Krill Oil 500 MG CAPS Take 500 mg by mouth 2 times daily      Multiple Vitamins-Minerals (THERAPEUTIC MULTIVITAMIN-MINERALS) tablet Take 1 tablet by mouth daily      buPROPion (WELLBUTRIN SR) 150 MG extended release tablet Take 150 mg by mouth daily      metFORMIN (GLUCOPHAGE) 1000 MG tablet Take 1,000 mg by mouth 2 times daily (with meals)      sertraline (ZOLOFT) 100 MG tablet Take 150 mg by mouth daily      simvastatin (ZOCOR) 40 MG tablet Take 40 mg by mouth nightly      glipiZIDE (GLUCOTROL) 10 MG tablet Take 10 mg by mouth daily       No current facility-administered medications for this visit. Laboratory & Diagnostics:  Old labs  June 2021: K 5.5, Creat 1.5, Glucose 170  UA: no blood, no protein  US: no hydronephrosis, Prevoid 478 ml    Sept 2021: K 4.5, Creat 1.3, Ca 10.1, Hb 13.5, PTH 26, Vit d 50, UPCR 100 mg/g  Oct 2022: K 4.9, Creat 1.1, Ca 10, UPCR 200 mg/g     Impression/Plan:   1. CKD III: At baseline. Lytes are stable. UPCR 200 mg/g  2. Hyperkalemia: resolved. Potassium is stable. 3. HTN: on coreg 3.125 mg BID. Stable BP readings. 4. DM: on metformin and glucotrol    Overall stable renal function. Orders Placed This Encounter   Procedures    Basic Metabolic Panel    Protein / creatinine ratio, urine     Return in about 1 year (around 11/1/2023).       Vianey Luciano MD  [facility]

## 2023-03-05 RX ORDER — CARVEDILOL 6.25 MG/1
6.25 TABLET ORAL 2 TIMES DAILY
Qty: 180 TABLET | Refills: 3 | Status: SHIPPED | OUTPATIENT
Start: 2023-03-05

## 2023-11-03 DIAGNOSIS — N18.31 CHRONIC KIDNEY DISEASE, STAGE 3A (HCC): Primary | ICD-10-CM

## 2023-11-03 LAB
BUN BLDV-MCNC: 20 MG/DL
CALCIUM SERPL-MCNC: 9.6 MG/DL
CHLORIDE BLD-SCNC: 98 MMOL/L
CO2: 28 MMOL/L
CREAT SERPL-MCNC: 1.2 MG/DL
EGFR: 67
GLUCOSE BLD-MCNC: 287 MG/DL
POTASSIUM SERPL-SCNC: 4.9 MMOL/L
SODIUM BLD-SCNC: 136 MMOL/L

## 2023-11-10 ENCOUNTER — OFFICE VISIT (OUTPATIENT)
Dept: NEPHROLOGY | Age: 68
End: 2023-11-10
Payer: COMMERCIAL

## 2023-11-10 VITALS
OXYGEN SATURATION: 94 % | HEART RATE: 67 BPM | BODY MASS INDEX: 37.59 KG/M2 | SYSTOLIC BLOOD PRESSURE: 142 MMHG | DIASTOLIC BLOOD PRESSURE: 78 MMHG | WEIGHT: 240 LBS

## 2023-11-10 DIAGNOSIS — E11.21 DIABETIC NEPHROPATHY ASSOCIATED WITH TYPE 2 DIABETES MELLITUS (HCC): ICD-10-CM

## 2023-11-10 DIAGNOSIS — N18.31 CHRONIC KIDNEY DISEASE, STAGE 3A (HCC): Primary | ICD-10-CM

## 2023-11-10 DIAGNOSIS — I12.9 HYPERTENSIVE RENAL DISEASE, STAGE 1 THROUGH STAGE 4 OR UNSPECIFIED CHRONIC KIDNEY DISEASE: ICD-10-CM

## 2023-11-10 PROCEDURE — 1123F ACP DISCUSS/DSCN MKR DOCD: CPT | Performed by: INTERNAL MEDICINE

## 2023-11-10 PROCEDURE — 3074F SYST BP LT 130 MM HG: CPT | Performed by: INTERNAL MEDICINE

## 2023-11-10 PROCEDURE — 99213 OFFICE O/P EST LOW 20 MIN: CPT | Performed by: INTERNAL MEDICINE

## 2023-11-10 PROCEDURE — 3078F DIAST BP <80 MM HG: CPT | Performed by: INTERNAL MEDICINE

## 2023-11-10 RX ORDER — CARVEDILOL 6.25 MG/1
6.25 TABLET ORAL 2 TIMES DAILY
Qty: 60 TABLET | Refills: 3
Start: 2023-11-10

## 2023-11-14 ENCOUNTER — TELEPHONE (OUTPATIENT)
Dept: NEPHROLOGY | Age: 68
End: 2023-11-14

## 2024-02-27 RX ORDER — CARVEDILOL 6.25 MG/1
6.25 TABLET ORAL 2 TIMES DAILY
Qty: 180 TABLET | Refills: 3 | Status: SHIPPED | OUTPATIENT
Start: 2024-02-27

## 2024-02-27 NOTE — TELEPHONE ENCOUNTER
Pt's wife called to inform us that pt needs a refill on his carvedilol.    Script pending    Next appt 11/8/24

## 2024-05-13 NOTE — TELEPHONE ENCOUNTER
[facility] notified. Verbalized understanding. Awake

## 2024-11-01 LAB
BUN / CREAT RATIO: 18 (ref 7–25)
BUN BLDV-MCNC: 23 MG/DL (ref 3–29)
CALCIUM SERPL-MCNC: 10 MG/DL (ref 8.5–10.5)
CHLORIDE BLD-SCNC: 100 MEQ/L (ref 96–110)
CO2: 27 MEQ/L (ref 19–32)
CREAT SERPL-MCNC: 1.3 MG/DL (ref 0.5–1.2)
CREATININE URINE: 82.7 MG/DL
ESTIMATED GLOMERULAR FILTRATION RATE CREATININE EQUATION: 61 MLS/MIN/1.73M2
FASTING STATUS: ABNORMAL
GLUCOSE BLD-MCNC: 195 MG/DL (ref 70–99)
POTASSIUM SERPL-SCNC: 5 MEQ/L (ref 3.4–5.3)
PROTEIN, URINE, RANDOM: 39 MG/DL
PROTEIN/CREAT RATIO URINE RAN: 0.47 MG/MG (ref 0.01–0.17)
SODIUM BLD-SCNC: 137 MEQ/L (ref 135–148)

## 2024-11-08 ENCOUNTER — OFFICE VISIT (OUTPATIENT)
Dept: NEPHROLOGY | Age: 69
End: 2024-11-08

## 2024-11-08 VITALS
OXYGEN SATURATION: 95 % | BODY MASS INDEX: 37.83 KG/M2 | SYSTOLIC BLOOD PRESSURE: 157 MMHG | WEIGHT: 241 LBS | HEIGHT: 67 IN | HEART RATE: 59 BPM | DIASTOLIC BLOOD PRESSURE: 80 MMHG

## 2024-11-08 DIAGNOSIS — N18.31 CHRONIC KIDNEY DISEASE, STAGE 3A (HCC): Primary | ICD-10-CM

## 2024-11-08 DIAGNOSIS — I12.9 HYPERTENSIVE RENAL DISEASE, STAGE 1 THROUGH STAGE 4 OR UNSPECIFIED CHRONIC KIDNEY DISEASE: ICD-10-CM

## 2024-11-08 RX ORDER — TRAZODONE HYDROCHLORIDE 50 MG/1
TABLET, FILM COATED ORAL
COMMUNITY
Start: 2024-09-15

## 2024-11-08 RX ORDER — AMLODIPINE BESYLATE 5 MG/1
5 TABLET ORAL DAILY
Qty: 90 TABLET | Refills: 3 | Status: SHIPPED | OUTPATIENT
Start: 2024-11-08

## 2024-11-08 NOTE — PROGRESS NOTES
MCG (400 UNIT) CAPS Capsule Take 1 capsule by mouth daily      Flaxseed, Linseed, (FLAXSEED OIL PO) Take 1,000 mg by mouth 2 times daily      Krill Oil 500 MG CAPS Take 500 mg by mouth 2 times daily      Multiple Vitamins-Minerals (THERAPEUTIC MULTIVITAMIN-MINERALS) tablet Take 1 tablet by mouth daily      buPROPion (WELLBUTRIN SR) 150 MG extended release tablet Take 1 tablet by mouth daily      metFORMIN (GLUCOPHAGE) 1000 MG tablet Take 1 tablet by mouth 2 times daily (with meals)      sertraline (ZOLOFT) 100 MG tablet Take 1.5 tablets by mouth daily      simvastatin (ZOCOR) 40 MG tablet Take 1 tablet by mouth nightly      glipiZIDE (GLUCOTROL) 10 MG tablet Take 1 tablet by mouth daily       No current facility-administered medications for this visit.        Laboratory & Diagnostics:  Old labs  June 2021: K 5.5, Creat 1.5, Glucose 170  UA: no blood, no protein    Sept 2021: K 4.5, Creat 1.3, Ca 10.1, Hb 13.5, PTH 26, Vit d 50, UPCR 100 mg/g  Oct 2022: K 4.9, Creat 1.1, Ca 10, UPCR 200 mg/g    Nov 2023: K 4.9, creat 1.2, Glucose 287, Ca 9.6, UPCR 300 mg/g  Nov 2024: UPCR 472 mg/g, K 5.0, creat 1.3     Impression/Plan:   1. CKD III: Doing well overall. Stable renal function. At baseline. Advised good DM and BP control. BP is little high.   2. Hyperkalemia: resolved. Potassium is stable.   3. HTN: on coreg 3.125 mg BID. Will not increase due to his HR. Add norvasc 5 mg daily. I've asked him to call me in 4 weeks with some BP readings. If needed will consider adding HCTZ or losartan next time.   4. DM: on metformin and glucotrol. Mild proteinuria. K is borderline so holding off losartan for now.  Advised low K diet.      June 2021 reviewed.     Orders Placed This Encounter   Procedures    Basic Metabolic Panel    Protein / creatinine ratio, urine     Return in about 1 year (around 11/8/2025).    Enrique Cortez MD  [facility]

## 2025-02-21 RX ORDER — CARVEDILOL 6.25 MG/1
6.25 TABLET ORAL 2 TIMES DAILY
Qty: 180 TABLET | Refills: 3 | Status: SHIPPED | OUTPATIENT
Start: 2025-02-21